# Patient Record
Sex: MALE | Race: WHITE | Employment: OTHER | URBAN - METROPOLITAN AREA
[De-identification: names, ages, dates, MRNs, and addresses within clinical notes are randomized per-mention and may not be internally consistent; named-entity substitution may affect disease eponyms.]

---

## 2021-12-08 ENCOUNTER — OFFICE VISIT (OUTPATIENT)
Dept: FAMILY MEDICINE CLINIC | Facility: CLINIC | Age: 66
End: 2021-12-08
Payer: MEDICARE

## 2021-12-08 VITALS
SYSTOLIC BLOOD PRESSURE: 136 MMHG | BODY MASS INDEX: 29.58 KG/M2 | HEART RATE: 61 BPM | HEIGHT: 64.25 IN | DIASTOLIC BLOOD PRESSURE: 80 MMHG | WEIGHT: 173.25 LBS

## 2021-12-08 DIAGNOSIS — R68.82 DECREASED LIBIDO: ICD-10-CM

## 2021-12-08 DIAGNOSIS — C61 PROSTATE CANCER (HCC): ICD-10-CM

## 2021-12-08 DIAGNOSIS — R61 NIGHT SWEATS: Primary | ICD-10-CM

## 2021-12-08 DIAGNOSIS — R19.4 BOWEL HABIT CHANGES: ICD-10-CM

## 2021-12-08 DIAGNOSIS — F51.01 PRIMARY INSOMNIA: ICD-10-CM

## 2021-12-08 DIAGNOSIS — R14.0 BLOATED ABDOMEN: ICD-10-CM

## 2021-12-08 DIAGNOSIS — N52.9 ERECTILE DYSFUNCTION, UNSPECIFIED ERECTILE DYSFUNCTION TYPE: ICD-10-CM

## 2021-12-08 PROCEDURE — 90662 IIV NO PRSV INCREASED AG IM: CPT | Performed by: FAMILY MEDICINE

## 2021-12-08 PROCEDURE — G0008 ADMIN INFLUENZA VIRUS VAC: HCPCS | Performed by: FAMILY MEDICINE

## 2021-12-08 PROCEDURE — 99204 OFFICE O/P NEW MOD 45 MIN: CPT | Performed by: FAMILY MEDICINE

## 2021-12-08 RX ORDER — SIMETHICONE 125 MG
250 TABLET,CHEWABLE ORAL EVERY 12 HOURS PRN
Qty: 60 TABLET | Refills: 3 | Status: SHIPPED | OUTPATIENT
Start: 2021-12-08

## 2021-12-08 RX ORDER — FLUTICASONE PROPIONATE 50 MCG
2 SPRAY, SUSPENSION (ML) NASAL DAILY
COMMUNITY
Start: 2020-01-10

## 2021-12-08 RX ORDER — TEMAZEPAM 30 MG/1
30 CAPSULE ORAL NIGHTLY PRN
Qty: 90 CAPSULE | Refills: 1 | Status: SHIPPED | OUTPATIENT
Start: 2021-12-08 | End: 2021-12-13

## 2021-12-08 RX ORDER — OMEPRAZOLE 40 MG/1
40 CAPSULE, DELAYED RELEASE ORAL DAILY
COMMUNITY
Start: 2020-08-19

## 2021-12-08 NOTE — PROGRESS NOTES
12/8/2021  11:57 AM    Corinne Ardon is a 74year old male.     Chief complaint(s): Patient presents with:  Establish Care: has been feeling very hot at night (w/out sweats)  Sleep Problem: trouble sleeping  Bloating: x1m    HPI:     Corinne Noblesjohny primary c Age of Onset   • Cancer Father         Prostate cancer   • Cancer Brother         Prosate cancer      Social History: Social History    Tobacco Use      Smoking status: Never Smoker      Smokeless tobacco: Never Used    Vaping Use      Vaping Use: Never us Musculoskeletal: Negative for myalgias. Skin: Negative for rash. Neurological: Negative for dizziness, weakness and headaches. Psychiatric/Behavioral: Positive for suicidal ideas. The patient is nervous/anxious.         PHYSICAL EXAM:   VS: /8 Orders Placed This     UROLOGY - EXTERNAL       RECOMMENDATIONS given include: Patient was reassured of  his medical condition and all questions and concerns were answered.  Patient was informed to please, call our office with any new or further questions o Free T4      Quantiferon TB Plus      Fluzone High Dose 65 yr and older PFS [14937]   . TESTS/PROCEDURES ordered today include sleep study ( to be done at [address] [place] [address] ).     MEDICATIONS:   Requested Prescriptions     Signed Prescriptions Disp Refills   • lula With Platelet      PSA, Total W Reflex To Free      TSH W Reflex To Free T4      Quantiferon TB Plus      Fluzone High Dose 72 yr and older PFS [89520]      Meds This Visit:  Requested Prescriptions     Signed Prescriptions Disp Refills   • simethicone 125

## 2021-12-09 ENCOUNTER — TELEPHONE (OUTPATIENT)
Dept: FAMILY MEDICINE CLINIC | Facility: CLINIC | Age: 66
End: 2021-12-09

## 2021-12-09 RX ORDER — ZOLPIDEM TARTRATE 10 MG/1
10 TABLET ORAL NIGHTLY
Qty: 90 TABLET | Refills: 1 | Status: SHIPPED | OUTPATIENT
Start: 2021-12-09 | End: 2022-12-04

## 2021-12-09 NOTE — TELEPHONE ENCOUNTER
Current Outpatient Medications:   •  temazepam 30 MG Oral Cap, Take 1 capsule (30 mg total) by mouth nightly as needed for Sleep., Disp: 90 capsule, Rfl: 1        Message: Drug not covered by patient plan.  The alternative is [employer]

## 2021-12-10 ENCOUNTER — LAB ENCOUNTER (OUTPATIENT)
Dept: LAB | Age: 66
End: 2021-12-10
Attending: FAMILY MEDICINE
Payer: MEDICARE

## 2021-12-13 ENCOUNTER — TELEPHONE (OUTPATIENT)
Dept: FAMILY MEDICINE CLINIC | Facility: CLINIC | Age: 66
End: 2021-12-13

## 2021-12-13 NOTE — TELEPHONE ENCOUNTER
Current Outpatient Medications:   ••  temazepam 30 MG Oral Cap, Take 1 capsule (30 mg total) by mouth nightly as needed for Sleep., Disp: 90 capsule, Rfl: 1    Key: [id number]  PATIENT LAST NAME: VERNA  : 1955

## 2022-01-26 NOTE — PROGRESS NOTES
1/26/2022  11:42 AM    Anna Rockwell is a 74year old male. Chief complaint(s): Patient presents with: Follow - Up: Sleep problem - reports hot flashes w/o sweats    HPI:     Anna Rockwell primary complaint is regarding as above.      Insomnia details; Dose 65 YRS & Older PRSV Free (72699)                          11/25/2020 12/08/2021      FLUZONE 6 months and older PFS 0.5 ml (27635)                          10/11/2016  11/01/2017      Fluvirin, 3 Years & >, Im                          11/11/2013 No rash.    Psychiatric:         Mood and Affect: Mood normal.         LABORATORY RESULTS:   No results found for: Dalia Denis   Results for orders placed or performed in visit on 12/22/21   LIPID PANEL   Result Value MONOCYTES 10.5 %    EOSINOPHILS 2.4 %    BASOPHILS 0.2 %   TSH W REFLEX TO FREE T4   Result Value Ref Range    TSH W/REFLEX TO FT4 4.24 0.40 - 4.50 mIU/L   PSA, TOTAL WITH REFLEX TO PSA, FREE   Result Value Ref Range    TOTAL PSA 3.4 < OR = 4.0 ng/mL   WENDY requested or ordered in this encounter       Imaging & Referrals:  Jessica Man MD

## 2022-02-02 ENCOUNTER — OFFICE VISIT (OUTPATIENT)
Dept: ALLERGY | Facility: CLINIC | Age: 67
End: 2022-02-02
Payer: MEDICARE

## 2022-02-02 ENCOUNTER — NURSE ONLY (OUTPATIENT)
Dept: ALLERGY | Facility: CLINIC | Age: 67
End: 2022-02-02
Payer: MEDICARE

## 2022-02-02 VITALS
WEIGHT: 172 LBS | HEIGHT: 64.25 IN | BODY MASS INDEX: 29.37 KG/M2 | OXYGEN SATURATION: 99 % | DIASTOLIC BLOOD PRESSURE: 67 MMHG | SYSTOLIC BLOOD PRESSURE: 124 MMHG | HEART RATE: 71 BPM

## 2022-02-02 DIAGNOSIS — L30.9 DERMATITIS: ICD-10-CM

## 2022-02-02 DIAGNOSIS — Z92.29 COVID-19 VACCINE SERIES COMPLETED: ICD-10-CM

## 2022-02-02 DIAGNOSIS — L29.9 PRURITUS: Primary | ICD-10-CM

## 2022-02-02 DIAGNOSIS — L50.9 URTICARIA: ICD-10-CM

## 2022-02-02 DIAGNOSIS — T78.40XA ALLERGY, INITIAL ENCOUNTER: ICD-10-CM

## 2022-02-02 DIAGNOSIS — Z23 FLU VACCINE NEED: ICD-10-CM

## 2022-02-02 PROCEDURE — 95004 PERQ TESTS W/ALRGNC XTRCS: CPT | Performed by: ALLERGY & IMMUNOLOGY

## 2022-02-02 PROCEDURE — 99204 OFFICE O/P NEW MOD 45 MIN: CPT | Performed by: ALLERGY & IMMUNOLOGY

## 2022-02-02 RX ORDER — HYDROXYZINE HYDROCHLORIDE 25 MG/1
25 TABLET, FILM COATED ORAL NIGHTLY PRN
Qty: 30 TABLET | Refills: 0 | Status: SHIPPED | OUTPATIENT
Start: 2022-02-02

## 2022-02-02 RX ORDER — LEVOCETIRIZINE DIHYDROCHLORIDE 5 MG/1
5 TABLET, FILM COATED ORAL NIGHTLY
Qty: 30 TABLET | Refills: 0 | Status: SHIPPED | OUTPATIENT
Start: 2022-02-02 | End: 2022-02-02

## 2022-02-02 RX ORDER — TAMSULOSIN HYDROCHLORIDE 0.4 MG/1
0.4 CAPSULE ORAL DAILY
COMMUNITY
Start: 2022-01-27

## 2022-02-02 RX ORDER — LEVOCETIRIZINE DIHYDROCHLORIDE 5 MG/1
TABLET, FILM COATED ORAL
Qty: 90 TABLET | Refills: 0 | Status: SHIPPED | OUTPATIENT
Start: 2022-02-02 | End: 2022-03-02

## 2022-02-02 NOTE — PATIENT INSTRUCTIONS
#1 pruritus  Dermatographia screen appears negative. Normal CBC CMP TSH. Trial of moisturizer including Vanicream or Aquaphor  Trial of Xyzal, levocetirizine 5 mg once a day in the morning  May add hydroxyzine/Atarax 25 mg at bedtime. May cause sedation and sleepiness    #2 urticaria  Handouts provided and reviewed. See above skin testing to common food and environmental allergens to screen for allergic triggers  Trial of Xyzal, levocetirizine 5 mg once in the morning as an antihistamine. May add hydroxyzine, Atarax 25 mg once a night at bedtime. May cause sedation    #3 COVID vaccination up-to-date x2 doses. Reviewed with patient he is booster eligible.   Encouraged booster    #4 flu vaccine up-to-date      #5 Pneumovax 23 up-to-date

## 2022-02-15 ENCOUNTER — TELEPHONE (OUTPATIENT)
Dept: ALLERGY | Facility: CLINIC | Age: 67
End: 2022-02-15

## 2022-02-15 NOTE — TELEPHONE ENCOUNTER
Fax received from [facility] [place]. Follow up to coverage review inquiry. It appears this is the first notification we have received regarding this inquiry. \"in order to proceed with request for coverage review, we need to have this requested started by you or your patient. You may contact [employer] at [phone], 8 am to 8 pm local time Monday thru Friday, and 8 am to 8 pm EST on Saturday\".     Medication in question: Hydroxyzine hcl 25 mg tablet    Diagnosis code: Pruritus L29.9 & Urticaria L50.9    LOV: 02/02/2022

## 2022-02-24 NOTE — TELEPHONE ENCOUNTER
Fax from Med received requesting we complete PA form. RN completed PA form. Dr. Kristin Tijerina signature needed. Placed at nursing station for his review. RN to fax when completed.

## 2022-02-28 NOTE — TELEPHONE ENCOUNTER
Received PA approval via fax from [facility] for Hydroxyzine 25 mg tablets-#30- 1 tablet by mouth daily. [facility] and spoke with Eleanor Cooper, pharmacist, regarding PA approval for Hydroxyzine. Pharmacist verbalizes understanding and will fill prescription and will contact patient to  medication,no further questions at this time.

## 2022-03-02 ENCOUNTER — OFFICE VISIT (OUTPATIENT)
Dept: ALLERGY | Facility: CLINIC | Age: 67
End: 2022-03-02
Payer: MEDICARE

## 2022-03-02 VITALS
RESPIRATION RATE: 22 BRPM | BODY MASS INDEX: 29.37 KG/M2 | SYSTOLIC BLOOD PRESSURE: 116 MMHG | HEIGHT: 64.25 IN | WEIGHT: 172 LBS | DIASTOLIC BLOOD PRESSURE: 64 MMHG | HEART RATE: 75 BPM | OXYGEN SATURATION: 97 %

## 2022-03-02 DIAGNOSIS — Z23 FLU VACCINE NEED: ICD-10-CM

## 2022-03-02 DIAGNOSIS — L29.9 PRURITUS: Primary | ICD-10-CM

## 2022-03-02 DIAGNOSIS — Z92.29 COVID-19 VACCINE SERIES COMPLETED: ICD-10-CM

## 2022-03-02 DIAGNOSIS — L50.9 URTICARIA: ICD-10-CM

## 2022-03-02 PROCEDURE — 99214 OFFICE O/P EST MOD 30 MIN: CPT | Performed by: ALLERGY & IMMUNOLOGY

## 2022-03-02 RX ORDER — FINASTERIDE 5 MG/1
1 TABLET, FILM COATED ORAL DAILY
COMMUNITY
Start: 2022-03-01

## 2022-03-02 RX ORDER — LEVOCETIRIZINE DIHYDROCHLORIDE 5 MG/1
5 TABLET, FILM COATED ORAL EVERY 12 HOURS PRN
Qty: 180 TABLET | Refills: 1 | Status: SHIPPED | OUTPATIENT
Start: 2022-03-02

## 2022-03-02 NOTE — PATIENT INSTRUCTIONS
#1 pruritus/urticaria    Recs:  Improved with recent trial of Xyzal.  Currently taking Xyzal 5 mg once a night at bedtime. Patient recently started Atarax/hydroxyzine 25 mg last night at bedtime for the first time. Patient still having itching during the day  Dermatographia screen appears negative    Recommend increase Xyzal, levocetirizine 5 mg to twice a day  May still use hydroxyzine/Atarax 25 mg at bedtime but may cause sleepiness or drowsiness  Recommend Vanicream as a moisturizer twice a day to help prevent dry skin    #2 COVID vaccination up-to-date x2 doses.   Recommend booster as patient is booster eligible    #3 flu vaccine up-to-date    Follow-up in 6 months or sooner if needed

## 2022-03-14 ENCOUNTER — TELEPHONE (OUTPATIENT)
Dept: FAMILY MEDICINE CLINIC | Facility: CLINIC | Age: 67
End: 2022-03-14

## 2022-03-14 NOTE — TELEPHONE ENCOUNTER
[facility] [address], [address] [facility], [phone], [phone]       Additional Information    Associated Reports   View Encounter   Priority and Order Details     Outpatient Medication Detail     Disp Refills Start End    fluticasone propionate 50 MCG/ACT Nasal Suspension 1 each 1 3/14/2022     Sig - Route: 2 sprays by Nasal route daily. - Nasal    Sent to pharmacy as: Fluticasone Propionate 50 MCG/ACT Nasal Suspension (FLONASE)    E-Prescribing Status: Receipt confirmed by pharmacy (3/14/2022 10:05 AM CDT)

## 2022-05-17 ENCOUNTER — OFFICE VISIT (OUTPATIENT)
Dept: FAMILY MEDICINE CLINIC | Facility: CLINIC | Age: 67
End: 2022-05-17
Payer: MEDICARE

## 2022-05-17 ENCOUNTER — HOSPITAL ENCOUNTER (OUTPATIENT)
Dept: GENERAL RADIOLOGY | Age: 67
Discharge: HOME | End: 2022-05-17
Attending: FAMILY MEDICINE
Payer: MEDICARE

## 2022-05-17 VITALS
HEART RATE: 61 BPM | DIASTOLIC BLOOD PRESSURE: 68 MMHG | BODY MASS INDEX: 28.68 KG/M2 | HEIGHT: 64 IN | WEIGHT: 168 LBS | SYSTOLIC BLOOD PRESSURE: 119 MMHG

## 2022-05-17 DIAGNOSIS — M25.551 HIP PAIN, ACUTE, RIGHT: ICD-10-CM

## 2022-05-17 DIAGNOSIS — M25.561 ACUTE PAIN OF RIGHT KNEE: ICD-10-CM

## 2022-05-17 DIAGNOSIS — M25.551 HIP PAIN, ACUTE, RIGHT: Primary | ICD-10-CM

## 2022-05-17 PROCEDURE — 73502 X-RAY EXAM HIP UNI 2-3 VIEWS: CPT | Performed by: FAMILY MEDICINE

## 2022-05-17 PROCEDURE — 73562 X-RAY EXAM OF KNEE 3: CPT | Performed by: FAMILY MEDICINE

## 2022-05-17 PROCEDURE — 99213 OFFICE O/P EST LOW 20 MIN: CPT | Performed by: FAMILY MEDICINE

## 2022-05-17 PROCEDURE — 96372 THER/PROPH/DIAG INJ SC/IM: CPT | Performed by: FAMILY MEDICINE

## 2022-05-17 RX ORDER — TEA TREE OIL 100 %
OIL (ML) TOPICAL
Qty: 180 CAPSULE | Refills: 3 | Status: SHIPPED | OUTPATIENT
Start: 2022-05-17

## 2022-05-17 RX ORDER — NAPROXEN 500 MG/1
500 TABLET ORAL 2 TIMES DAILY WITH MEALS
Qty: 60 TABLET | Refills: 1 | Status: SHIPPED | OUTPATIENT
Start: 2022-05-17

## 2022-05-17 RX ORDER — METHYLPREDNISOLONE 4 MG/1
TABLET ORAL
Qty: 21 EACH | Refills: 0 | Status: SHIPPED | OUTPATIENT
Start: 2022-05-17

## 2022-05-17 RX ORDER — KETOROLAC TROMETHAMINE 30 MG/ML
30 INJECTION, SOLUTION INTRAMUSCULAR; INTRAVENOUS ONCE
Status: COMPLETED | OUTPATIENT
Start: 2022-05-17 | End: 2022-05-17

## 2022-05-17 RX ADMIN — KETOROLAC TROMETHAMINE 30 MG: 30 INJECTION, SOLUTION INTRAMUSCULAR; INTRAVENOUS at 13:09:00

## 2022-05-26 ENCOUNTER — TELEPHONE (OUTPATIENT)
Dept: FAMILY MEDICINE CLINIC | Facility: CLINIC | Age: 67
End: 2022-05-26

## 2022-05-26 DIAGNOSIS — M25.551 HIP PAIN, ACUTE, RIGHT: Primary | ICD-10-CM

## 2022-05-26 NOTE — TELEPHONE ENCOUNTER
Patient states during last visit Dr. Alex Loya gave him a shot for pain in his leg and had been doing well, pain is back and would like to know if Dr. Alex Loya can prescribe him something for the pain.

## 2022-05-26 NOTE — TELEPHONE ENCOUNTER
With , patient stated that saw Dr Simran Acuña on 5/17/2022 and had a shot for pain in his right groin/right knee. Patient stated that the pain returned yesterday. Having the pain in the right groin radiating to the right knee. Painful to sit, stand, or put on his shoes. Pain level is 7-8/10 when not doing anything, when tries to lift the leg pain level is 10/10. Taking naproxen but not really helping. Asked if he ever took the medrol dose pack and he stated that he did not. Patient is limping. Patient wanted to get something prescribed for the pain? Please advise.

## 2022-05-26 NOTE — TELEPHONE ENCOUNTER
With assist of language line #[phone]  Attempted to call pt, no answer,no vm set up. Please attempt again later.

## 2022-05-26 NOTE — TELEPHONE ENCOUNTER
With , advised patient of Dr. Lindsay Salvador note. Patient verbalized understanding and now remembers taking the medrol dose pack stated that a lot of pills the first day and less and less everyday. Stated finished that a few days ago. Please advise on next step. Ren[facility] pharmacist indicated that the medrol dose pack was picked up by patient on 5/17/2022.

## 2022-05-27 NOTE — TELEPHONE ENCOUNTER
With [employer] ID# [id number]. Identified patient's name and . Informed of recommendation for Physical Therapy. He states that he began exercising at home and is feeling better.  He states he will discuss further with Dr. Bishop Cooney at his upcoming appointment:    Future Appointments   Date Time Provider Torrey Franca   2022 10:00 AM Sulma Wilkes MD [facility], [place]   2022 11:30 AM Abby Ortiz MD [facility] Lorri Nava

## 2022-05-31 ENCOUNTER — TELEPHONE (OUTPATIENT)
Dept: ALLERGY | Facility: CLINIC | Age: 67
End: 2022-05-31

## 2022-06-01 NOTE — TELEPHONE ENCOUNTER
Medication PA requested:  levocetirizine dihydrochloride 5mg         Dx Code:   Pruritus L29.9     Urticaria L50.9     Description of Diagnosis:   Pruritus L29.9     Urticaria L50.9    Key or Insurance Telephone #:Pedroza [id number]     CPT Code:      Case Number/Pending Referral #:      Pt. Authorization Number:     Pt.  Authorization Date:     Pt contacted/Pharmacy contacted if needed:

## 2022-06-02 NOTE — TELEPHONE ENCOUNTER
Medication PA Requested: levocetirizine (XYZAL) 5 MG Oral Tab                                                         CoverMyMeds Used:  Key:  Quantity:180 tablet  Day Supply:  SigTake 1 tablet (5 mg total) by mouth every 12 (twelve) hours as needed for Allergies.:  DX Code:Pruritus L29.9  Urticaria L50.9                                       Faxed [employer] PA form with [facility] 03/02/2022    Awaiting determination.

## 2022-06-08 NOTE — TELEPHONE ENCOUNTER
Attempted to call patient to inform him of the PA approval. Voicemail box has not been set up. No mychart available. Approval sent to scan into this encounter.

## 2022-06-08 NOTE — TELEPHONE ENCOUNTER
[employer]  [phone], Spoke with Dodie Ramon. She states PA for Levocetirizine handled at [phone], transferred to [facility]. She states Pa rejecting for plan  Limitations, only covered for one per day. Performed PA for quantity limit, she states is approved from 5/9/2022-6/8/2023, case #[id number]. She got paid claim on test.   Call ref # [id number]    [facility] [phone], spoke with [facility]. She states 180 for 90 went through. Please notify patient, no my chart account available. Thank you!

## 2022-08-11 ENCOUNTER — OFFICE VISIT (OUTPATIENT)
Dept: FAMILY MEDICINE CLINIC | Facility: CLINIC | Age: 67
End: 2022-08-11
Payer: MEDICARE

## 2022-08-11 VITALS
HEIGHT: 64 IN | SYSTOLIC BLOOD PRESSURE: 124 MMHG | DIASTOLIC BLOOD PRESSURE: 73 MMHG | BODY MASS INDEX: 27.42 KG/M2 | HEART RATE: 61 BPM | WEIGHT: 160.63 LBS

## 2022-08-11 DIAGNOSIS — M15.9 PRIMARY OSTEOARTHRITIS INVOLVING MULTIPLE JOINTS: Primary | ICD-10-CM

## 2022-08-11 PROCEDURE — 99213 OFFICE O/P EST LOW 20 MIN: CPT | Performed by: FAMILY MEDICINE

## 2022-08-11 RX ORDER — FLUTICASONE PROPIONATE 50 MCG
2 SPRAY, SUSPENSION (ML) NASAL DAILY
Qty: 1 EACH | Refills: 1 | Status: SHIPPED | OUTPATIENT
Start: 2022-08-11

## 2022-09-07 ENCOUNTER — OFFICE VISIT (OUTPATIENT)
Dept: ALLERGY | Facility: CLINIC | Age: 67
End: 2022-09-07
Payer: MEDICARE

## 2022-09-07 VITALS
RESPIRATION RATE: 18 BRPM | WEIGHT: 158 LBS | SYSTOLIC BLOOD PRESSURE: 120 MMHG | HEART RATE: 55 BPM | OXYGEN SATURATION: 97 % | HEIGHT: 64 IN | DIASTOLIC BLOOD PRESSURE: 66 MMHG | BODY MASS INDEX: 26.98 KG/M2

## 2022-09-07 DIAGNOSIS — Z23 FLU VACCINE NEED: ICD-10-CM

## 2022-09-07 DIAGNOSIS — L50.9 URTICARIA: ICD-10-CM

## 2022-09-07 DIAGNOSIS — Z92.29 COVID-19 VACCINE SERIES COMPLETED: ICD-10-CM

## 2022-09-07 DIAGNOSIS — L29.9 PRURITUS: Primary | ICD-10-CM

## 2022-09-07 PROCEDURE — 99214 OFFICE O/P EST MOD 30 MIN: CPT | Performed by: ALLERGY & IMMUNOLOGY

## 2022-09-07 RX ORDER — LEVOCETIRIZINE DIHYDROCHLORIDE 5 MG/1
5 TABLET, FILM COATED ORAL DAILY
Qty: 90 TABLET | Refills: 1 | Status: SHIPPED | OUTPATIENT
Start: 2022-09-07

## 2022-09-07 RX ORDER — HYDROXYZINE HYDROCHLORIDE 25 MG/1
25 TABLET, FILM COATED ORAL NIGHTLY PRN
Qty: 90 TABLET | Refills: 1 | Status: SHIPPED | OUTPATIENT
Start: 2022-09-07

## 2022-09-07 NOTE — PATIENT INSTRUCTIONS
#1 pruritus/dermatitis  Stable at this time with current regimen including Xyzal in the morning, hydroxyzine at bedtime. Patient also using triamcinolone as needed. Continue with above regimen. Medications refilled  Dove as a soap or Cetaphil as a cleanser  Recommend moisturizers including Aquaphor or Cetaphil CeraVe a or Vanicream    #2 COVID vaccines x3 doses. Reviewed with patient he is eligible for fourth dose. Based upon his age    #3 recommend high-dose flu vaccine this fall    #4 pneumonia pneumonia vaccine up-to-date from 2020 .   May consider Prevnar 20 as well to complete the pneumonia vaccine series    Follow-up in 1 year or sooner if needed

## 2022-10-11 ENCOUNTER — OFFICE VISIT (OUTPATIENT)
Dept: FAMILY MEDICINE CLINIC | Facility: CLINIC | Age: 67
End: 2022-10-11
Payer: MEDICARE

## 2022-10-11 VITALS
HEIGHT: 64 IN | SYSTOLIC BLOOD PRESSURE: 139 MMHG | BODY MASS INDEX: 27.35 KG/M2 | WEIGHT: 160.19 LBS | HEART RATE: 59 BPM | DIASTOLIC BLOOD PRESSURE: 78 MMHG

## 2022-10-11 DIAGNOSIS — R68.82 DECREASED LIBIDO: ICD-10-CM

## 2022-10-11 DIAGNOSIS — Z00.00 MEDICARE ANNUAL WELLNESS VISIT, SUBSEQUENT: Primary | ICD-10-CM

## 2022-10-11 DIAGNOSIS — R19.4 BOWEL HABIT CHANGES: ICD-10-CM

## 2022-10-11 DIAGNOSIS — E55.9 VITAMIN D DEFICIENCY: ICD-10-CM

## 2022-10-11 DIAGNOSIS — Z12.11 COLON CANCER SCREENING: ICD-10-CM

## 2022-10-11 DIAGNOSIS — R73.9 HYPERGLYCEMIA: ICD-10-CM

## 2022-10-11 DIAGNOSIS — C61 PROSTATE CANCER (HCC): ICD-10-CM

## 2022-10-11 DIAGNOSIS — H53.8 BLURRED VISION: ICD-10-CM

## 2022-10-11 DIAGNOSIS — Z91.81 RISK FOR FALLS: ICD-10-CM

## 2022-10-11 DIAGNOSIS — N52.9 ERECTILE DYSFUNCTION, UNSPECIFIED ERECTILE DYSFUNCTION TYPE: ICD-10-CM

## 2022-10-11 DIAGNOSIS — M15.9 PRIMARY OSTEOARTHRITIS INVOLVING MULTIPLE JOINTS: ICD-10-CM

## 2022-10-11 DIAGNOSIS — R61 NIGHT SWEATS: ICD-10-CM

## 2022-10-11 DIAGNOSIS — H43.399 VITREOUS FLOATERS, UNSPECIFIED LATERALITY: ICD-10-CM

## 2022-10-11 DIAGNOSIS — F51.01 PRIMARY INSOMNIA: ICD-10-CM

## 2022-10-11 PROCEDURE — 90677 PCV20 VACCINE IM: CPT | Performed by: FAMILY MEDICINE

## 2022-10-11 PROCEDURE — 90662 IIV NO PRSV INCREASED AG IM: CPT | Performed by: FAMILY MEDICINE

## 2022-10-11 PROCEDURE — G0439 PPPS, SUBSEQ VISIT: HCPCS | Performed by: FAMILY MEDICINE

## 2022-10-11 PROCEDURE — G0008 ADMIN INFLUENZA VIRUS VAC: HCPCS | Performed by: FAMILY MEDICINE

## 2022-10-11 PROCEDURE — G0009 ADMIN PNEUMOCOCCAL VACCINE: HCPCS | Performed by: FAMILY MEDICINE

## 2022-10-18 RX ORDER — FLUTICASONE PROPIONATE 50 MCG
SPRAY, SUSPENSION (ML) NASAL
Qty: 16 G | Refills: 0 | Status: SHIPPED | OUTPATIENT
Start: 2022-10-18

## 2022-10-26 ENCOUNTER — NURSE TRIAGE (OUTPATIENT)
Dept: FAMILY MEDICINE CLINIC | Facility: CLINIC | Age: 67
End: 2022-10-26

## 2022-10-26 ENCOUNTER — APPOINTMENT (OUTPATIENT)
Dept: GENERAL RADIOLOGY | Age: 67
End: 2022-10-26
Attending: PHYSICIAN ASSISTANT
Payer: MEDICARE

## 2022-10-26 ENCOUNTER — HOSPITAL ENCOUNTER (OUTPATIENT)
Age: 67
Discharge: HOME | End: 2022-10-26
Payer: MEDICARE

## 2022-10-26 VITALS
OXYGEN SATURATION: 96 % | SYSTOLIC BLOOD PRESSURE: 125 MMHG | DIASTOLIC BLOOD PRESSURE: 75 MMHG | HEART RATE: 87 BPM | TEMPERATURE: 100 F | RESPIRATION RATE: 16 BRPM

## 2022-10-26 DIAGNOSIS — Z20.822 ENCOUNTER FOR LABORATORY TESTING FOR COVID-19 VIRUS: ICD-10-CM

## 2022-10-26 DIAGNOSIS — R05.1 ACUTE COUGH: Primary | ICD-10-CM

## 2022-10-26 LAB — SARS-COV-2 RNA RESP QL NAA+PROBE: NOT DETECTED

## 2022-10-26 PROCEDURE — U0002 COVID-19 LAB TEST NON-CDC: HCPCS | Performed by: PHYSICIAN ASSISTANT

## 2022-10-26 PROCEDURE — 99203 OFFICE O/P NEW LOW 30 MIN: CPT | Performed by: PHYSICIAN ASSISTANT

## 2022-10-26 PROCEDURE — 71046 X-RAY EXAM CHEST 2 VIEWS: CPT | Performed by: PHYSICIAN ASSISTANT

## 2022-10-26 RX ORDER — CODEINE PHOSPHATE AND GUAIFENESIN 10; 100 MG/5ML; MG/5ML
5 SOLUTION ORAL EVERY 6 HOURS PRN
Qty: 50 ML | Refills: 0 | Status: SHIPPED | OUTPATIENT
Start: 2022-10-26

## 2022-10-26 RX ORDER — BENZONATATE 100 MG/1
100 CAPSULE ORAL 3 TIMES DAILY PRN
Qty: 30 CAPSULE | Refills: 0 | Status: SHIPPED | OUTPATIENT
Start: 2022-10-26 | End: 2022-11-25

## 2022-10-26 RX ORDER — ALBUTEROL SULFATE 90 UG/1
2 AEROSOL, METERED RESPIRATORY (INHALATION) EVERY 4 HOURS PRN
Qty: 1 EACH | Refills: 0 | Status: SHIPPED | OUTPATIENT
Start: 2022-10-26 | End: 2022-11-25

## 2022-10-26 NOTE — ED INITIAL ASSESSMENT (HPI)
Pt here w c/o cough, HA, feeling like he cant cough up phlemg and feels like wheezing, no fever. States having symptoms x 1 wk.

## 2022-11-05 LAB
% FREE PSA: 31 % (CALC)
ABSOLUTE BASOPHILS: 10 CELLS/UL (ref 0–200)
ABSOLUTE EOSINOPHILS: 130 CELLS/UL (ref 15–500)
ABSOLUTE LYMPHOCYTES: 1845 CELLS/UL (ref 850–3900)
ABSOLUTE MONOCYTES: 490 CELLS/UL (ref 200–950)
ABSOLUTE NEUTROPHILS: 2525 CELLS/UL (ref 1500–7800)
ALBUMIN/GLOBULIN RATIO: 1.3 (CALC) (ref 1–2.5)
ALBUMIN: 4 G/DL (ref 3.6–5.1)
ALKALINE PHOSPHATASE: 79 U/L (ref 35–144)
ALT: 16 U/L (ref 9–46)
APPEARANCE: CLEAR
AST: 13 U/L (ref 10–35)
BASOPHILS: 0.2 %
BILIRUBIN, TOTAL: 0.4 MG/DL (ref 0.2–1.2)
BILIRUBIN: NEGATIVE
BUN: 16 MG/DL (ref 7–25)
CALCIUM: 9.2 MG/DL (ref 8.6–10.3)
CARBON DIOXIDE: 27 MMOL/L (ref 20–32)
CHLORIDE: 104 MMOL/L (ref 98–110)
CHOL/HDLC RATIO: 3 (CALC)
CHOLESTEROL, TOTAL: 154 MG/DL
COLOR: YELLOW
CREATININE: 0.9 MG/DL (ref 0.7–1.35)
EGFR: 93 ML/MIN/1.73M2
EOSINOPHILS: 2.6 %
FREE PSA: 0.8 NG/ML
GLOBULIN: 3.2 G/DL (CALC) (ref 1.9–3.7)
GLUCOSE: 89 MG/DL (ref 65–99)
GLUCOSE: NEGATIVE
HDL CHOLESTEROL: 51 MG/DL
HEMATOCRIT: 41.1 % (ref 38.5–50)
HEMOGLOBIN A1C: 5.2 % OF TOTAL HGB
HEMOGLOBIN: 13.8 G/DL (ref 13.2–17.1)
KETONES: NEGATIVE
LDL-CHOLESTEROL: 89 MG/DL (CALC)
LEUKOCYTE ESTERASE: NEGATIVE
LYMPHOCYTES: 36.9 %
MCH: 30.3 PG (ref 27–33)
MCHC: 33.6 G/DL (ref 32–36)
MCV: 90.1 FL (ref 80–100)
MITOGEN-NIL: >10 IU/ML
MONOCYTES: 9.8 %
MPV: 10.1 FL (ref 7.5–12.5)
NEUTROPHILS: 50.5 %
NIL: 0.02 IU/ML
NITRITE: NEGATIVE
NON-HDL CHOLESTEROL: 103 MG/DL (CALC)
OCCULT BLOOD: NEGATIVE
PH: 5.5 (ref 5–8)
PLATELET COUNT: 207 THOUSAND/UL (ref 140–400)
POTASSIUM: 4.2 MMOL/L (ref 3.5–5.3)
PROTEIN, TOTAL: 7.2 G/DL (ref 6.1–8.1)
PROTEIN: NEGATIVE
QUANTIFERON(R)-TB GOLD PLUS, 1 TUBE: NEGATIVE
RDW: 12.6 % (ref 11–15)
RED BLOOD CELL COUNT: 4.56 MILLION/UL (ref 4.2–5.8)
SIGNAL TO CUT-OFF: 0.05
SODIUM: 137 MMOL/L (ref 135–146)
SPECIFIC GRAVITY: 1.02 (ref 1–1.03)
TB1-NIL: 0 IU/ML
TB2-NIL: 0.01 IU/ML
TOTAL PSA: 2.6 NG/ML
TRIGLYCERIDES: 58 MG/DL
TSH W/REFLEX TO FT4: 3.41 MIU/L (ref 0.4–4.5)
WHITE BLOOD CELL COUNT: 5 THOUSAND/UL (ref 3.8–10.8)

## 2022-11-23 RX ORDER — FLUTICASONE PROPIONATE 50 MCG
2 SPRAY, SUSPENSION (ML) NASAL DAILY
Qty: 48 G | Refills: 0 | Status: SHIPPED | OUTPATIENT
Start: 2022-11-23

## 2022-11-24 NOTE — TELEPHONE ENCOUNTER
Refill passed per Matteo Rachel protocol.    Requested Prescriptions   Pending Prescriptions Disp Refills    FLUTICASONE PROPIONATE 50 MCG/ACT Nasal Suspension [Pharmacy Med Name: FLUTICASONE 50MCG CONSTANZA SP(120SP) RX] 48 g 0     Sig: SHAKE LIQUID AND USE 2 SPRAYS IN EACH NOSTRIL DAILY       Allergy Medication Protocol Passed - 11/23/2022 12:30 PM        Passed - In person appointment or virtual visit in the past 12 mos or appointment in next 3 mos     Recent Outpatient Visits              1 month ago Medicare annual wellness visit, subsequent    [address], Yuriy Ospina MD    Office Visit    2 months ago Pruritus    [facility], Nayla Rust MD    Office Visit    3 months ago Primary osteoarthritis involving multiple joints    [address], Yuriy Ospina MD    Office Visit    6 months ago Hip pain, acute, right    [facility], Yuriy Ospina MD    Office Visit    8 months ago Primary insomnia    [address], Britton Balderas MD    Office Visit          Future Appointments         Provider Department Appt Notes    In 1 month MD Matteo Soria [address], Yuriy Tellez     In 9 months MD Matteo Milian, [facility]                     Recent Outpatient Visits              [facility]    [facility], Britton Balderas MD    Office Visit    2 months ago Pruritus    [facility]awanda Torres [facility], Nayla Rust MD    Office Visit    3 months ago Primary osteoarthritis involving multiple joints    [facility], Yuriy Ospina MD    Office Visit    6 months ago Hip pain, acute, right    [facility], Yuriy Ospina MD    Office Visit    8 months ago Primary insomnia    [place] Yuriy Macias MD    Office Visit            Future Appointments         Provider Department Appt Notes    In 1 month Kaila Klein MD [address], Yuriy Follow     In 9 months Yoli Vale MD [address]

## 2022-11-24 NOTE — TELEPHONE ENCOUNTER
Refill passed per [address] protocol.    Requested Prescriptions   Pending Prescriptions Disp Refills    FLUTICASONE PROPIONATE 50 MCG/ACT Nasal Suspension [Pharmacy Med Name: FLUTICASONE 50MCG CONSTANZA SP(120SP) RX] 48 g 0     Sig: SHAKE LIQUID AND USE 2 SPRAYS IN EACH NOSTRIL DAILY       Allergy Medication Protocol Passed - 11/23/2022 12:30 PM        Passed - In person appointment or virtual visit in the past 12 mos or appointment in next 3 mos     Recent Outpatient Visits              1 month ago Medicare annual wellness visit, subsequent    [address] Yuri[address] Lewis MD    Office Visit    2 months ago Pruritus    [address], [facility], Serjio Joseph MD    Office Visit    3 months ago Primary osteoarthritis involving multiple joints    [address] Yuri[address] Lewis MD    Office Visit    6 months ago Hip pain, acute, right    [address], Yuriy Sears MD    Office Visit    8 months ago Primary insomnia    [address], Evelyn Mendosa MD    Office Visit          Future Appointments         Provider Department Appt Notes    In 1 month June Sears MD [address], Yuriy Tellez     In 9 months Xiao Jimenez MD [address], [address] [facility]                     Recent Outpatient Visits              [facility]    [address], Evelyn Mendosa MD    Office Visit    2 months ago Pruritus    [address] [facility], [address] [facility], Serjio Joseph MD    Office Visit    3 months ago Primary osteoarthritis involving multiple joints    [address], Yuriy Sears MD    Office Visit    6 months ago Hip pain, acute, right    [address], Yuriy Sears MD    Office Visit    8 months ago Primary insomnia    [place] Yuriy Anderson MD    Office Visit            Future Appointments         Provider Department Appt Notes    In 1 month Dmirti Peguero MD [address] Follow     In 9 months Salty Maxwell MD [address]

## 2022-11-28 RX ORDER — ZOLPIDEM TARTRATE 10 MG/1
10 TABLET ORAL NIGHTLY
Qty: 90 TABLET | Refills: 1 | Status: SHIPPED | OUTPATIENT
Start: 2022-11-28

## 2022-12-10 RX ORDER — OMEPRAZOLE 40 MG/1
40 CAPSULE, DELAYED RELEASE ORAL DAILY
Qty: 90 CAPSULE | Refills: 1 | Status: SHIPPED | OUTPATIENT
Start: 2022-12-10

## 2022-12-10 NOTE — TELEPHONE ENCOUNTER
Refill passed per [facility] protocol.      Requested Prescriptions   Pending Prescriptions Disp Refills    OMEPRAZOLE 40 MG Oral Capsule Delayed Release [Pharmacy Med Name: OMEPRAZOLE 40MG CAPSULES] 90 capsule 2     Sig: TAKE 1 CAPSULE(40 MG) BY MOUTH DAILY       Gastrointestional Medication Protocol Passed - 12/9/2022  1:30 PM        Passed - In person appointment or virtual visit in the past 12 mos or appointment in next 3 mos     Recent Outpatient Visits              2 months ago Medicare annual wellness visit, subsequent    [address] Yuriy Lewis MD    Office Visit    3 months ago Pruritus    [place] [employer] [facility], [address] [facility], Kishore Ahmadi MD    Office Visit    4 months ago Primary osteoarthritis involving multiple joints    [address] Yuriy Lewis MD    Office Visit    6 months ago [facility] pain, acute, right    [facility], [address], Yuriy Bernard MD    Office Visit    9 months ago Primary insomnia    [facility], [address], Burgess Nakita MD    Office Visit          Future Appointments         Provider Department Appt Notes    In 1 month Moises Bernard MD [place] [employer] [facility], [address], Yuriy Tellez     In 9 months Angel Guardado MD [place] [employer] [facility], [address] [facility]                      Recent Outpatient Visits              [facility] months [facility], subsequent    [place] [employer] [facility], [address], Burgess Nakita MD    Office Visit    3 months ago Pruritus    [place] [employer] [facility], [address] [facility], Kishore Ahmadi MD    Office Visit    4 months ago Primary osteoarthritis involving multiple joints    [place] [employer] [facility], [address], Yuriy Bernard MD    Office Visit    6 months ago Hip pain, acute, right    [place] [employer] [facility], [address], Yuriy Bernard MD    Office Visit    9 months ago Primary insomnia    Steve Antonio Camille Sarabia MD    Office Visit             Future Appointments         Provider Department Appt Notes    In 1 month Jose Antonio Alvarez MD [facility], [facility], [address], Yuriy Follow     In 9 months Ajay Wilhelm MD [facility], [facility], [address]

## 2023-01-17 ENCOUNTER — OFFICE VISIT (OUTPATIENT)
Dept: FAMILY MEDICINE CLINIC | Facility: CLINIC | Age: 68
End: 2023-01-17

## 2023-01-17 VITALS
HEIGHT: 64 IN | DIASTOLIC BLOOD PRESSURE: 68 MMHG | SYSTOLIC BLOOD PRESSURE: 130 MMHG | BODY MASS INDEX: 27.59 KG/M2 | HEART RATE: 68 BPM | WEIGHT: 161.63 LBS

## 2023-01-17 DIAGNOSIS — J30.89 NON-SEASONAL ALLERGIC RHINITIS, UNSPECIFIED TRIGGER: Primary | ICD-10-CM

## 2023-01-17 PROCEDURE — 3075F SYST BP GE 130 - 139MM HG: CPT | Performed by: FAMILY MEDICINE

## 2023-01-17 PROCEDURE — 99213 OFFICE O/P EST LOW 20 MIN: CPT | Performed by: FAMILY MEDICINE

## 2023-01-17 PROCEDURE — 3008F BODY MASS INDEX DOCD: CPT | Performed by: FAMILY MEDICINE

## 2023-01-17 PROCEDURE — 1126F AMNT PAIN NOTED NONE PRSNT: CPT | Performed by: FAMILY MEDICINE

## 2023-01-17 PROCEDURE — 3078F DIAST BP <80 MM HG: CPT | Performed by: FAMILY MEDICINE

## 2023-01-17 RX ORDER — DESLORATADINE 5 MG/1
5 TABLET ORAL DAILY
Qty: 90 TABLET | Refills: 3 | Status: SHIPPED | OUTPATIENT
Start: 2023-01-17 | End: 2024-01-12

## 2023-01-17 RX ORDER — FLUTICASONE PROPIONATE 50 MCG
2 SPRAY, SUSPENSION (ML) NASAL DAILY
Qty: 48 G | Refills: 2 | Status: SHIPPED | OUTPATIENT
Start: 2023-01-17

## 2023-08-18 ENCOUNTER — EKG ENCOUNTER (OUTPATIENT)
Dept: LAB | Age: 68
End: 2023-08-18
Attending: FAMILY MEDICINE
Payer: MEDICARE

## 2023-08-18 ENCOUNTER — OFFICE VISIT (OUTPATIENT)
Dept: FAMILY MEDICINE CLINIC | Facility: CLINIC | Age: 68
End: 2023-08-18

## 2023-08-18 ENCOUNTER — LAB ENCOUNTER (OUTPATIENT)
Dept: LAB | Age: 68
End: 2023-08-18
Attending: FAMILY MEDICINE
Payer: MEDICARE

## 2023-08-18 VITALS
HEIGHT: 64 IN | BODY MASS INDEX: 27.21 KG/M2 | HEART RATE: 62 BPM | WEIGHT: 159.38 LBS | DIASTOLIC BLOOD PRESSURE: 84 MMHG | SYSTOLIC BLOOD PRESSURE: 151 MMHG

## 2023-08-18 DIAGNOSIS — R73.9 HYPERGLYCEMIA: ICD-10-CM

## 2023-08-18 DIAGNOSIS — E55.9 VITAMIN D DEFICIENCY: ICD-10-CM

## 2023-08-18 DIAGNOSIS — F51.01 PRIMARY INSOMNIA: ICD-10-CM

## 2023-08-18 DIAGNOSIS — M15.9 PRIMARY OSTEOARTHRITIS INVOLVING MULTIPLE JOINTS: ICD-10-CM

## 2023-08-18 DIAGNOSIS — R68.82 DECREASED LIBIDO: ICD-10-CM

## 2023-08-18 DIAGNOSIS — C61 PROSTATE CANCER (HCC): ICD-10-CM

## 2023-08-18 DIAGNOSIS — Z00.00 MEDICARE ANNUAL WELLNESS VISIT, SUBSEQUENT: ICD-10-CM

## 2023-08-18 DIAGNOSIS — Z12.11 COLON CANCER SCREENING: ICD-10-CM

## 2023-08-18 DIAGNOSIS — H53.8 BLURRED VISION: ICD-10-CM

## 2023-08-18 DIAGNOSIS — H43.399 VITREOUS FLOATERS, UNSPECIFIED LATERALITY: ICD-10-CM

## 2023-08-18 DIAGNOSIS — N52.9 ERECTILE DYSFUNCTION, UNSPECIFIED ERECTILE DYSFUNCTION TYPE: ICD-10-CM

## 2023-08-18 DIAGNOSIS — Z91.81 RISK FOR FALLS: ICD-10-CM

## 2023-08-18 DIAGNOSIS — Z00.00 MEDICARE ANNUAL WELLNESS VISIT, SUBSEQUENT: Primary | ICD-10-CM

## 2023-08-18 LAB
ALBUMIN SERPL-MCNC: 3.8 G/DL (ref 3.4–5)
ALBUMIN/GLOB SERPL: 1 {RATIO} (ref 1–2)
ALP LIVER SERPL-CCNC: 91 U/L
ALT SERPL-CCNC: 28 U/L
ANION GAP SERPL CALC-SCNC: 2 MMOL/L (ref 0–18)
AST SERPL-CCNC: 15 U/L (ref 15–37)
BASOPHILS # BLD AUTO: 0.02 X10(3) UL (ref 0–0.2)
BASOPHILS NFR BLD AUTO: 0.4 %
BILIRUB SERPL-MCNC: 0.6 MG/DL (ref 0.1–2)
BILIRUB UR QL STRIP.AUTO: NEGATIVE
BUN BLD-MCNC: 17 MG/DL (ref 7–18)
CALCIUM BLD-MCNC: 9.1 MG/DL (ref 8.5–10.1)
CHLORIDE SERPL-SCNC: 107 MMOL/L (ref 98–112)
CHOLEST SERPL-MCNC: 158 MG/DL (ref ?–200)
CLARITY UR REFRACT.AUTO: CLEAR
CO2 SERPL-SCNC: 28 MMOL/L (ref 21–32)
CREAT BLD-MCNC: 0.8 MG/DL
EGFRCR SERPLBLD CKD-EPI 2021: 96 ML/MIN/1.73M2 (ref 60–?)
EOSINOPHIL # BLD AUTO: 0.09 X10(3) UL (ref 0–0.7)
EOSINOPHIL NFR BLD AUTO: 1.8 %
ERYTHROCYTE [DISTWIDTH] IN BLOOD BY AUTOMATED COUNT: 12.4 %
EST. AVERAGE GLUCOSE BLD GHB EST-MCNC: 105 MG/DL (ref 68–126)
FASTING PATIENT LIPID ANSWER: YES
FASTING STATUS PATIENT QL REPORTED: YES
GLOBULIN PLAS-MCNC: 3.7 G/DL (ref 2.8–4.4)
GLUCOSE BLD-MCNC: 93 MG/DL (ref 70–99)
GLUCOSE UR STRIP.AUTO-MCNC: NORMAL MG/DL
HBA1C MFR BLD: 5.3 % (ref ?–5.7)
HCT VFR BLD AUTO: 42.1 %
HDLC SERPL-MCNC: 54 MG/DL (ref 40–59)
HGB BLD-MCNC: 14.3 G/DL
IMM GRANULOCYTES # BLD AUTO: 0.01 X10(3) UL (ref 0–1)
IMM GRANULOCYTES NFR BLD: 0.2 %
KETONES UR STRIP.AUTO-MCNC: NEGATIVE MG/DL
LDLC SERPL CALC-MCNC: 81 MG/DL (ref ?–100)
LEUKOCYTE ESTERASE UR QL STRIP.AUTO: NEGATIVE
LYMPHOCYTES # BLD AUTO: 2 X10(3) UL (ref 1–4)
LYMPHOCYTES NFR BLD AUTO: 40.8 %
MCH RBC QN AUTO: 30.8 PG (ref 26–34)
MCHC RBC AUTO-ENTMCNC: 34 G/DL (ref 31–37)
MCV RBC AUTO: 90.7 FL
MONOCYTES # BLD AUTO: 0.61 X10(3) UL (ref 0.1–1)
MONOCYTES NFR BLD AUTO: 12.4 %
NEUTROPHILS # BLD AUTO: 2.17 X10 (3) UL (ref 1.5–7.7)
NEUTROPHILS # BLD AUTO: 2.17 X10(3) UL (ref 1.5–7.7)
NEUTROPHILS NFR BLD AUTO: 44.4 %
NITRITE UR QL STRIP.AUTO: NEGATIVE
NONHDLC SERPL-MCNC: 104 MG/DL (ref ?–130)
OSMOLALITY SERPL CALC.SUM OF ELEC: 285 MOSM/KG (ref 275–295)
PH UR STRIP.AUTO: 5 [PH] (ref 5–8)
PLATELET # BLD AUTO: 163 10(3)UL (ref 150–450)
POTASSIUM SERPL-SCNC: 4.3 MMOL/L (ref 3.5–5.1)
PROT SERPL-MCNC: 7.5 G/DL (ref 6.4–8.2)
PROT UR STRIP.AUTO-MCNC: NEGATIVE MG/DL
PSA FREE MFR SERPL: 34 %
PSA FREE SERPL-MCNC: 1.37 NG/ML
PSA SERPL-MCNC: 4.08 NG/ML (ref ?–4)
RBC # BLD AUTO: 4.64 X10(6)UL
RBC UR QL AUTO: NEGATIVE
SODIUM SERPL-SCNC: 137 MMOL/L (ref 136–145)
SP GR UR STRIP.AUTO: 1.02 (ref 1–1.03)
TRIGL SERPL-MCNC: 131 MG/DL (ref 30–149)
TSI SER-ACNC: 3.02 MIU/ML (ref 0.36–3.74)
UROBILINOGEN UR STRIP.AUTO-MCNC: NORMAL MG/DL
VIT D+METAB SERPL-MCNC: 30.6 NG/ML (ref 30–100)
VLDLC SERPL CALC-MCNC: 21 MG/DL (ref 0–30)
WBC # BLD AUTO: 4.9 X10(3) UL (ref 4–11)

## 2023-08-18 PROCEDURE — 84443 ASSAY THYROID STIM HORMONE: CPT | Performed by: FAMILY MEDICINE

## 2023-08-18 PROCEDURE — 84153 ASSAY OF PSA TOTAL: CPT | Performed by: FAMILY MEDICINE

## 2023-08-18 PROCEDURE — 83036 HEMOGLOBIN GLYCOSYLATED A1C: CPT | Performed by: FAMILY MEDICINE

## 2023-08-18 PROCEDURE — 80053 COMPREHEN METABOLIC PANEL: CPT | Performed by: FAMILY MEDICINE

## 2023-08-18 PROCEDURE — 80061 LIPID PANEL: CPT | Performed by: FAMILY MEDICINE

## 2023-08-18 PROCEDURE — 84154 ASSAY OF PSA FREE: CPT | Performed by: FAMILY MEDICINE

## 2023-08-18 PROCEDURE — 82306 VITAMIN D 25 HYDROXY: CPT

## 2023-08-18 PROCEDURE — 85025 COMPLETE CBC W/AUTO DIFF WBC: CPT | Performed by: FAMILY MEDICINE

## 2023-08-18 PROCEDURE — 93010 ELECTROCARDIOGRAM REPORT: CPT | Performed by: INTERNAL MEDICINE

## 2023-08-18 PROCEDURE — 93005 ELECTROCARDIOGRAM TRACING: CPT

## 2023-08-18 PROCEDURE — 81003 URINALYSIS AUTO W/O SCOPE: CPT | Performed by: FAMILY MEDICINE

## 2023-08-18 PROCEDURE — 36415 COLL VENOUS BLD VENIPUNCTURE: CPT | Performed by: FAMILY MEDICINE

## 2023-08-18 RX ORDER — FLUTICASONE PROPIONATE 50 MCG
2 SPRAY, SUSPENSION (ML) NASAL DAILY
Qty: 48 G | Refills: 2 | Status: SHIPPED | OUTPATIENT
Start: 2023-08-18

## 2023-08-18 RX ORDER — ZOLPIDEM TARTRATE 10 MG/1
10 TABLET ORAL NIGHTLY
Qty: 90 TABLET | Refills: 1 | Status: SHIPPED | OUTPATIENT
Start: 2023-08-18

## 2023-08-19 LAB
ATRIAL RATE: 45 BPM
P AXIS: 54 DEGREES
P-R INTERVAL: 182 MS
Q-T INTERVAL: 444 MS
QRS DURATION: 84 MS
QTC CALCULATION (BEZET): 384 MS
R AXIS: -2 DEGREES
T AXIS: 27 DEGREES
VENTRICULAR RATE: 45 BPM

## 2023-08-29 ENCOUNTER — OFFICE VISIT (OUTPATIENT)
Dept: FAMILY MEDICINE CLINIC | Facility: CLINIC | Age: 68
End: 2023-08-29

## 2023-08-29 VITALS
HEIGHT: 64 IN | SYSTOLIC BLOOD PRESSURE: 126 MMHG | WEIGHT: 161 LBS | DIASTOLIC BLOOD PRESSURE: 75 MMHG | BODY MASS INDEX: 27.49 KG/M2 | TEMPERATURE: 97 F | HEART RATE: 65 BPM

## 2023-08-29 DIAGNOSIS — R97.20 ELEVATED PSA: ICD-10-CM

## 2023-08-29 DIAGNOSIS — E55.9 VITAMIN D DEFICIENCY: ICD-10-CM

## 2023-08-29 DIAGNOSIS — C61 PROSTATE CANCER (HCC): Primary | ICD-10-CM

## 2023-08-29 DIAGNOSIS — T78.40XD ALLERGY, SUBSEQUENT ENCOUNTER: ICD-10-CM

## 2023-08-29 PROCEDURE — 3078F DIAST BP <80 MM HG: CPT | Performed by: FAMILY MEDICINE

## 2023-08-29 PROCEDURE — 1160F RVW MEDS BY RX/DR IN RCRD: CPT | Performed by: FAMILY MEDICINE

## 2023-08-29 PROCEDURE — 99213 OFFICE O/P EST LOW 20 MIN: CPT | Performed by: FAMILY MEDICINE

## 2023-08-29 PROCEDURE — 3008F BODY MASS INDEX DOCD: CPT | Performed by: FAMILY MEDICINE

## 2023-08-29 PROCEDURE — 1159F MED LIST DOCD IN RCRD: CPT | Performed by: FAMILY MEDICINE

## 2023-08-29 PROCEDURE — 3074F SYST BP LT 130 MM HG: CPT | Performed by: FAMILY MEDICINE

## 2023-08-29 RX ORDER — DESLORATADINE 5 MG/1
5 TABLET ORAL DAILY
Qty: 30 TABLET | Refills: 1 | Status: SHIPPED | OUTPATIENT
Start: 2023-08-29 | End: 2024-08-23

## 2023-08-29 RX ORDER — ERGOCALCIFEROL 1.25 MG/1
50000 CAPSULE ORAL WEEKLY
Qty: 12 CAPSULE | Refills: 4 | Status: SHIPPED | OUTPATIENT
Start: 2023-08-29 | End: 2023-09-28

## 2023-08-30 RX ORDER — DESLORATADINE 5 MG/1
5 TABLET ORAL DAILY
Qty: 90 TABLET | Refills: 3 | OUTPATIENT
Start: 2023-08-30 | End: 2024-08-24

## 2023-10-02 ENCOUNTER — OFFICE VISIT (OUTPATIENT)
Dept: ALLERGY | Facility: CLINIC | Age: 68
End: 2023-10-02

## 2023-10-02 VITALS
WEIGHT: 161 LBS | HEIGHT: 64 IN | HEART RATE: 56 BPM | SYSTOLIC BLOOD PRESSURE: 127 MMHG | OXYGEN SATURATION: 97 % | BODY MASS INDEX: 27.49 KG/M2 | DIASTOLIC BLOOD PRESSURE: 74 MMHG

## 2023-10-02 DIAGNOSIS — L50.9 URTICARIA: ICD-10-CM

## 2023-10-02 DIAGNOSIS — Z92.29 COVID-19 VACCINE SERIES COMPLETED: ICD-10-CM

## 2023-10-02 DIAGNOSIS — L29.9 PRURITUS: Primary | ICD-10-CM

## 2023-10-02 DIAGNOSIS — Z23 FLU VACCINE NEED: ICD-10-CM

## 2023-10-02 PROCEDURE — 3078F DIAST BP <80 MM HG: CPT | Performed by: ALLERGY & IMMUNOLOGY

## 2023-10-02 PROCEDURE — 99214 OFFICE O/P EST MOD 30 MIN: CPT | Performed by: ALLERGY & IMMUNOLOGY

## 2023-10-02 PROCEDURE — 3008F BODY MASS INDEX DOCD: CPT | Performed by: ALLERGY & IMMUNOLOGY

## 2023-10-02 PROCEDURE — 3074F SYST BP LT 130 MM HG: CPT | Performed by: ALLERGY & IMMUNOLOGY

## 2023-10-02 PROCEDURE — 1159F MED LIST DOCD IN RCRD: CPT | Performed by: ALLERGY & IMMUNOLOGY

## 2023-10-02 RX ORDER — LEVOCETIRIZINE DIHYDROCHLORIDE 5 MG/1
5 TABLET, FILM COATED ORAL DAILY
Qty: 90 TABLET | Refills: 2 | Status: SHIPPED | OUTPATIENT
Start: 2023-10-02

## 2023-10-02 RX ORDER — TRIAMCINOLONE ACETONIDE 1 MG/G
OINTMENT TOPICAL
Qty: 60 G | Refills: 0 | Status: SHIPPED | OUTPATIENT
Start: 2023-10-02

## 2023-10-02 RX ORDER — HYDROXYZINE HYDROCHLORIDE 10 MG/1
10 TABLET, FILM COATED ORAL 3 TIMES DAILY PRN
Qty: 90 TABLET | Refills: 1 | Status: SHIPPED | OUTPATIENT
Start: 2023-10-02

## 2023-10-02 NOTE — PROGRESS NOTES
Morgan Draper is a 69year old male. HPI:   Patient presents with:  Rash: Patient presents for follow up for rash on upper arms and chest- states rash is better. Patient is a 68-year-old male who presents for follow-up with a chief complaint of pruritus and hives  Patient last seen by me in September 2022  Prior skin testing to environmental allergens and common food allergens was negative  COVID-vaccine x2 doses. Pneumovax up-to-date 2020    Medications listed include Clarinex Flonase Xyzal    Today patient reports      Hives  Active or persistent symptoms: worseafter heat and shower  No rash today  ED visits or prednisone in the interim: denies  Active meds: xyzal, atarax prn,         HISTORY:  Past Medical History:   Diagnosis Date    Cancer ([address]     Prostate cancer 2013       Past Surgical History:   Procedure Laterality Date    CHOLECYSTECTOMY  2012    COLONOSCOPY      2018    HEMORRHOIDECTOMY      1991    LAPAROSCOPIC CHOLECYSTECTOMY  2012      Family History   Problem Relation Age of Onset    Cancer Father         Prostate cancer    Cancer Brother         Prosate cancer      Social History:   Social History     Socioeconomic History    Marital status:    Tobacco Use    Smoking status: Never     Passive exposure: Never    Smokeless tobacco: Never   Vaping Use    Vaping Use: Never used   Substance and Sexual Activity    Alcohol use: Not Currently    Drug use: Never        Medications (Active prior to today's visit):  Current Outpatient Medications   Medication Sig Dispense Refill    hydrOXYzine 10 MG Oral Tab Take 1 tablet (10 mg total) by mouth 3 (three) times daily as needed for Itching. 90 tablet 1    levocetirizine (XYZAL) 5 MG Oral Tab Take 1 tablet (5 mg total) by mouth daily. 90 tablet 2    triamcinolone 0.1 % External Ointment Applied 2 times per day as needed 60 g 0    zolpidem 10 MG Oral Tab Take 1 tablet (10 mg total) by mouth nightly.  90 tablet 1    fluticasone propionate 50 MCG/ACT Nasal Suspension 2 sprays by Nasal route daily. 48 g 2    desloratadine (CLARINEX) 5 MG Oral Tab Take 1 tablet (5 mg total) by mouth daily. 90 tablet 3    finasteride 5 MG Oral Tab Take 1 tablet (5 mg total) by mouth daily. tamsulosin (FLOMAX) cap Take 1 capsule (0.4 mg total) by mouth daily. Omeprazole 40 MG Oral Capsule Delayed Release Take 1 capsule (40 mg total) by mouth daily. (Patient not taking: Reported on 10/2/2023) 90 capsule 1    hydrOXYzine 25 MG Oral Tab Take 1 tablet (25 mg total) by mouth nightly as needed for Itching.  (Patient not taking: Reported on 8/18/2023) 90 tablet 1       Allergies:  No Known Allergies      ROS:   Allergic/Immuno:  See hpi  Cardiovascular:  Negative for irregular heartbeat/palpitations, chest pain, edema  Constitutional:  Negative night sweats,weight loss, irritability and lethargy  ENMT:  Negative for ear drainage, hearing loss and nasal drainage  Eyes:  Negative for eye discharge and vision loss  Gastrointestinal:  Negative for abdominal pain, diarrhea and vomiting  Integumentary:  Negative for pruritus and rash  Respiratory:  Negative for cough, dyspnea and wheezing    PHYSICAL EXAM:   Constitutional: responsive, no acute distress noted  Head/Face: NC/Atraumatic  Eyes/Vision: conjunctiva and lids are normal extraocular motion is intact   Ears/Audiometry: tympanic membranes are normal bilaterally hearing is grossly intact  Nose/Mouth/Throat: nose and throat are clear mucous membranes are moist   Neck/Thyroid: neck is supple without adenopathy  Lymphatic: no abnormal cervical, supraclavicular or axillary adenopathy is noted  Respiratory: normal to inspection lungs are clear to auscultation bilaterally normal respiratory effort   Cardiovascular: regular rate and rhythm no murmurs, gallups, or rubs  Abdomen: soft non-tender non-distended  Skin/Hair: no unusual rashes present   Extremities: no edema, cyanosis, or clubbing     ASSESSMENT/PLAN:   Assessment   Pruritus (primary encounter diagnosis)  Urticaria  Covid-19 vaccine series completed  Flu vaccine need    #1 pruritus and urticaria  Stable at this time with Xyzal once a day and hydroxyzine as needed. Patient requested decrease in hydroxyzine dosing down from 25 mg. Continue with Xyzal once a day up to twice a day if needed  Prescription for hydroxyzine 10 mg every 4-6 hours as needed provided. Recommend moisturizing with Cetaphil CeraVe or Vanicream    #2 COVID-vaccine x2 doses. Recommend booster this fall. #3 flu vaccine recommended this fall with high-dose. #4 pneumonia vaccine up-to-date. Follow-up in 1 year or sooner if needed    Over 30 spent on care and visit          Orders This Visit:  No orders of the defined types were placed in this encounter. Meds This Visit:  Requested Prescriptions     Signed Prescriptions Disp Refills    hydrOXYzine 10 MG Oral Tab 90 tablet 1     Sig: Take 1 tablet (10 mg total) by mouth 3 (three) times daily as needed for Itching. levocetirizine (XYZAL) 5 MG Oral Tab 90 tablet 2     Sig: Take 1 tablet (5 mg total) by mouth daily. triamcinolone 0.1 % External Ointment 60 g 0     Sig: Applied 2 times per day as needed       Imaging & Referrals:  None     10/2/2023  Dane Romero MD    If medication samples were provided today, they were provided solely for patient education and training related to self administration of these medications. Teaching, instruction and sample was provided to the patient by myself. Teaching included  a review of potential adverse side effects as well as potential efficacy. Patient's questions were answered in regards to medication administration and dosing and potential side effects.  Teaching was provided via the teach back method

## 2023-10-02 NOTE — PATIENT INSTRUCTIONS
1 pruritus and urticaria  Stable at this time with Xyzal once a day and hydroxyzine as needed. Patient requested decrease in hydroxyzine dosing down from 25 mg. Continue with Xyzal once a day up to twice a day if needed  Prescription for hydroxyzine 10 mg every 4-6 hours as needed provided. Recommend moisturizing with Cetaphil CeraVe or Vanicream    #2 COVID-vaccine x2 doses. Recommend booster this fall. #3 flu vaccine recommended this fall with high-dose. #4 pneumonia vaccine up-to-date.       Follow-up in 1 year or sooner if needed

## 2023-11-08 ENCOUNTER — HOSPITAL ENCOUNTER (OUTPATIENT)
Age: 68
Discharge: HOME | End: 2023-11-08
Payer: MEDICARE

## 2023-11-08 ENCOUNTER — NURSE TRIAGE (OUTPATIENT)
Dept: FAMILY MEDICINE CLINIC | Facility: CLINIC | Age: 68
End: 2023-11-08

## 2023-11-08 VITALS
WEIGHT: 160 LBS | TEMPERATURE: 98 F | BODY MASS INDEX: 26.66 KG/M2 | RESPIRATION RATE: 18 BRPM | HEART RATE: 56 BPM | DIASTOLIC BLOOD PRESSURE: 76 MMHG | SYSTOLIC BLOOD PRESSURE: 116 MMHG | HEIGHT: 65 IN | OXYGEN SATURATION: 96 %

## 2023-11-08 DIAGNOSIS — L08.9: Primary | ICD-10-CM

## 2023-11-08 DIAGNOSIS — S90.522A: Primary | ICD-10-CM

## 2023-11-08 NOTE — ED INITIAL ASSESSMENT (HPI)
Pt presents to IC for wound check to top of lt foot that has been ongoing for one week   No drainage + red and painful

## 2023-11-08 NOTE — TELEPHONE ENCOUNTER
Action Requested: Summary for Provider     []  Critical Lab, Recommendations Needed  [] Need Additional Advice  []   FYI    []   Need Orders  [] Need Medications Sent to Pharmacy  []  Other     SUMMARY: Advised per protocol: see in office today. No appointments available. Recommended [facility]. Patient states he will go to the  at [facility] since he is familiar with the location. Reason for call: Infection  Onset: 1 week     With [address] ID# [id number], Patient calling (identified name and ) states he thinks he developed an infection on the top of his left foot after using a cream for arthritis (Cream Dragon) bought OTC. States the area is painful and red, skin is also open about 3/4 inch round. Area is dry and hard, slightly swollen, without drainage, denies fevers. States the original skin has come off. See care advise provided. Patient verbalized understanding and agrees with plan.        Reason for Disposition   Patient wants to be seen    Protocols used: Boil (Skin Abscess)-A-OH

## 2023-11-24 RX ORDER — HYDROXYZINE HYDROCHLORIDE 10 MG/1
10 TABLET, FILM COATED ORAL 3 TIMES DAILY PRN
Qty: 90 TABLET | Refills: 1 | Status: SHIPPED | OUTPATIENT
Start: 2023-11-24

## 2023-11-24 NOTE — TELEPHONE ENCOUNTER
Requested Prescriptions   Pending Prescriptions Disp Refills    HYDROXYZINE 10 MG Oral Tab [Pharmacy Med Name: HYDROXYZINE HCL 10MG TABLETS] 90 tablet 1     Sig: TAKE 1 TABLET(10 MG) BY MOUTH THREE TIMES DAILY AS NEEDED FOR ITCHING       Antihistamines Passed - 11/23/2023  1:44 PM        Passed - Appt in past 12 mos or next 1 mos     Recent Outpatient Visits              1 month ago Pruritus    Hans Abebe, Chiara Newton MD    Office Visit    2 months ago Prostate cancer [facility])    [employer]    Office Visit    3 months ago Medicare annual wellness visit, subsequent    Lillian [facility], MD    Office Visit    10 months ago Non-seasonal allergic rhinitis, unspecified trigger    Lorraine Bravo MD    Office Visit    1 year ago Medicare annual wellness visit, subsequent    Lorraine Bravo MD    Office Visit          Future Appointments         Provider Department Appt Notes    In 3 months MD Kristian Still [place] St[facility]

## 2024-02-05 ENCOUNTER — LAB ENCOUNTER (OUTPATIENT)
Dept: LAB | Age: 69
End: 2024-02-05
Attending: UROLOGY
Payer: MEDICARE

## 2024-02-05 DIAGNOSIS — C61 PROSTATE CANCER (HCC): Primary | ICD-10-CM

## 2024-02-05 LAB — PSA SERPL-MCNC: 1.9 NG/ML (ref ?–4)

## 2024-02-05 PROCEDURE — 84153 ASSAY OF PSA TOTAL: CPT

## 2024-02-05 PROCEDURE — 36415 COLL VENOUS BLD VENIPUNCTURE: CPT

## 2024-02-05 RX ORDER — HYDROXYZINE HYDROCHLORIDE 10 MG/1
10 TABLET, FILM COATED ORAL 3 TIMES DAILY PRN
Qty: 90 TABLET | Refills: 0 | Status: SHIPPED | OUTPATIENT
Start: 2024-02-05

## 2024-02-05 NOTE — TELEPHONE ENCOUNTER
Refill requested for   Requested Prescriptions   Pending Prescriptions Disp Refills    HYDROXYZINE 10 MG Oral Tab [Pharmacy Med Name: HYDROXYZINE HCL 10MG TABLETS] 90 tablet 1     Sig: TAKE 1 TABLET(10 MG) BY MOUTH THREE TIMES DAILY AS NEEDED FOR ITCHING       Antihistamines Passed - 2/3/2024  2:38 PM        Passed - Appt in past 12 mos or next 1 mos     Recent Outpatient Visits              4 months ago Pruritus    [facility] [place]    Office Visit    5 months ago [facility]state [facility] ([facility]    Office Visit    5 months ago Medicare annual wellness visit, subsequent    [facility]    Office Visit    1 year ago Non-seasonal allergic rhinitis, unspecified trigger    [facility]    Office Visit    1 year ago Medicare annual wellness visit, subsequent    [facility]    Office Visit          Future Appointments         Provider Department Appt Notes    In 2 weeks Timothy Lara MD [facility]     In 3 weeks Sharad Du MD [facility] [address], [place] MEDIC[facility] [place]                   Last office visit: 10/02/23    Previously advised to follow up in 1 year or sooner if needed    F/U currently scheduled? Not at this time    ACTION: Refill filled per protocol

## 2024-02-23 ENCOUNTER — TELEPHONE (OUTPATIENT)
Dept: GASTROENTEROLOGY | Facility: CLINIC | Age: 69
End: 2024-02-23

## 2024-02-23 ENCOUNTER — OFFICE VISIT (OUTPATIENT)
Dept: GASTROENTEROLOGY | Facility: CLINIC | Age: 69
End: 2024-02-23

## 2024-02-23 VITALS
WEIGHT: 162 LBS | DIASTOLIC BLOOD PRESSURE: 74 MMHG | SYSTOLIC BLOOD PRESSURE: 121 MMHG | HEIGHT: 65 IN | HEART RATE: 60 BPM | BODY MASS INDEX: 26.99 KG/M2

## 2024-02-23 DIAGNOSIS — Z12.11 COLON CANCER SCREENING: ICD-10-CM

## 2024-02-23 DIAGNOSIS — K21.9 GASTROESOPHAGEAL REFLUX DISEASE, UNSPECIFIED WHETHER ESOPHAGITIS PRESENT: Primary | ICD-10-CM

## 2024-02-23 DIAGNOSIS — R07.89 ATYPICAL CHEST PAIN: ICD-10-CM

## 2024-02-23 DIAGNOSIS — Z12.11 ENCOUNTER FOR SCREENING COLONOSCOPY: ICD-10-CM

## 2024-02-23 PROCEDURE — 3078F DIAST BP <80 MM HG: CPT | Performed by: INTERNAL MEDICINE

## 2024-02-23 PROCEDURE — 1159F MED LIST DOCD IN RCRD: CPT | Performed by: INTERNAL MEDICINE

## 2024-02-23 PROCEDURE — 3074F SYST BP LT 130 MM HG: CPT | Performed by: INTERNAL MEDICINE

## 2024-02-23 PROCEDURE — 3008F BODY MASS INDEX DOCD: CPT | Performed by: INTERNAL MEDICINE

## 2024-02-23 PROCEDURE — 99204 OFFICE O/P NEW MOD 45 MIN: CPT | Performed by: INTERNAL MEDICINE

## 2024-02-23 PROCEDURE — 1126F AMNT PAIN NOTED NONE PRSNT: CPT | Performed by: INTERNAL MEDICINE

## 2024-02-23 RX ORDER — POLYETHYLENE GLYCOL 3350, SODIUM SULFATE ANHYDROUS, SODIUM BICARBONATE, SODIUM CHLORIDE, POTASSIUM CHLORIDE 236; 22.74; 6.74; 5.86; 2.97 G/4L; G/4L; G/4L; G/4L; G/4L
4 POWDER, FOR SOLUTION ORAL ONCE
Qty: 1 EACH | Refills: 0 | Status: SHIPPED | OUTPATIENT
Start: 2024-02-23 | End: 2024-02-23

## 2024-02-23 RX ORDER — ERGOCALCIFEROL 1.25 MG/1
50000 CAPSULE ORAL WEEKLY
COMMUNITY
Start: 2024-02-15

## 2024-02-23 NOTE — PATIENT INSTRUCTIONS
Schedule EGD & colonoscopy exam at [facility] ([facility])    BMI Readings from Last 1 Encounters:   02/23/24 26.96 kg/m²      MAC anesthesia     Golytely (PEG) 4L bowel prep  Rx sent in to [facility]    Dx = GERD symptoms, atypical chest pain, screening colonoscopy examination    Medication instructions:    None    Hold Cialis/Tadalafil or Viagra/sildenafil or Levitra/Vardenafil, Stendra/Stendra medication > 3 days prior to procedure

## 2024-02-23 NOTE — PROGRESS NOTES
HPI:    Patient ID: Pranav Desai is a 68 year old man with BMI 27, distant history prostate cancer and apparently history of cholecystectomy 2012, ERCP 2013 in the [facility].    Last [place] colonoscopy examination appears to have been 2013.  Path report but not operative report copied below.  He underwent prostate biopsies in 2016 and 2018.    Mr. Desai is referred to us by Dr. Sharad Du to discuss his next screening colonoscopy examination.    On review of systems, Mr. Desai is most concerned with GERD symptoms and subjective allergies.    1.  Mr. Desai describes over 5 years of burning heartburn GERD symptoms.    Symptoms are associated with late-night meals, large heavy meals.    No associated dysphagia.    Takes omeprazole every day with relief.    2.  Suspected atypical allergies    Mr. Desai describes a syndrome of burning chest pain radiating up to his shoulders and especially his ears provoked by triggers including dairy, meats, sugar, citrus.    There is pruritus with this heat sensation.    He is seeing Dr. Barclay of allergy.  He has been prescribed Xyzal medication [id number]    3.  Erratic bowel patterns    Mr. Desai describes intermittent constipation and diarrhea.  Does not take anything regularly for that concern.      No family history colorectal cancer    Never smoker    Wt Readings from Last 20 Encounters:   02/23/24 162 lb (73.5 kg)   11/08/23 160 lb (72.6 kg)   10/02/23 161 lb (73 kg)   08/29/23 161 lb (73 kg)   08/18/23 159 lb 6.4 oz (72.3 kg)   01/17/23 161 lb 9.6 oz (73.3 kg)   10/11/22 160 lb 3.2 oz (72.7 kg)   09/07/22 158 lb (71.7 kg)   08/11/22 160 lb 9.6 oz (72.8 kg)   05/17/22 168 lb (76.2 kg)   03/14/22 170 lb 6.4 oz (77.3 kg)   03/02/22 172 lb (78 kg)   02/02/22 172 lb (78 kg)   01/26/22 172 lb (78 kg)   12/08/21 173 lb 4 oz (78.6 kg)         Previous EGD examination: ?  Previous colonoscopy(ies): 2013    [facility]    Review of Systems    ======================      Patient  Name:  ELENA GILLESPIE  Specimen #:  [id number]  Med. Rec. #:[id number]  Obtained:  10/7/2013  Reported: 10/9/2013    Specimen(s) Received  Random colon biopsy    FINAL DIAGNOSIS  a. Random Colon; BIOPSY:  -Colonic mucosa with mild focal active colitis  -See comment      COMMENT: The sections of the colonic mucosa show two small foci of mild active  colitis characterized by rare areas of cryptitis without gland distortion.  Differential diagnosis of focal active colitis includes; reaction to the bowel  preparation, a reaction to certain drugs (NSAIDs) and a resolving infection. No  granulomas are identified.    Primary Pathologist:  Daniella Garcia MD    **Report Electronically Signed**  Daniella Garcia MD    Operation  Colonoscopy    Clinical History  Diarrhea/constipation  Screening        Current Outpatient Medications   Medication Sig Dispense Refill    hydrOXYzine 10 MG Oral Tab TAKE 1 TABLET(10 MG) BY MOUTH THREE TIMES DAILY AS NEEDED FOR ITCHING 90 tablet 0    levocetirizine (XYZAL) 5 MG Oral Tab Take 1 tablet (5 mg total) by mouth daily. 90 tablet 2    triamcinolone 0.1 % External Ointment Applied 2 times per day as needed 60 g 0    zolpidem 10 MG Oral Tab Take 1 tablet (10 mg total) by mouth nightly. 90 tablet 1    fluticasone propionate 50 MCG/ACT Nasal Suspension 2 sprays by Nasal route daily. 48 g 2    Omeprazole 40 MG Oral Capsule Delayed Release Take 1 capsule (40 mg total) by mouth daily. (Patient not taking: Reported on 10/2/2023) 90 capsule 1    hydrOXYzine 25 MG Oral Tab Take 1 tablet (25 mg total) by mouth nightly as needed for Itching. (Patient not taking: Reported on 8/18/2023) 90 tablet 1    finasteride 5 MG Oral Tab Take 1 tablet (5 mg total) by mouth daily.      tamsulosin (FLOMAX) cap Take 1 capsule (0.4 mg total) by mouth daily.           Allergies:No Known Allergies  Imaging: No results found.       PHYSICAL EXAM:   Physical Exam           ASSESSMENT/PLAN:     68 year old man with BMI 27,  distant history prostate cancer and apparently history of cholecystectomy 2012, ERCP 2013 in the [facility].    Last [place] colonoscopy examination appears to have been 2013.  Path report but not operative report copied below.  He underwent prostate biopsies in 2016 and 2018.    Mr. Desai is referred to us by Dr. Sharad Du to discuss his next screening colonoscopy examination.    On review of systems, Mr. Desai is most concerned with GERD symptoms and subjective allergies.    1.  Mr. Desai describes over 5 years of burning heartburn GERD symptoms.    Symptoms are associated with late-night meals, large heavy meals.    No associated dysphagia.    Takes omeprazole every day with relief.    2.  Suspected atypical allergies    Mr. Desai describes a syndrome of burning chest pain radiating up to his shoulders and especially his ears provoked by triggers including dairy, meats, sugar, citrus.    There is pruritus with this heat sensation.    He is seeing Dr. Barclay of allergy.  He has been prescribed Xyzal medication [id number]    3.  Erratic bowel patterns    Mr. Desai describes intermittent constipation and diarrhea.  Does not take anything regularly for that concern.      No family history colorectal cancer    Never smoker    Suggest:    GERD symptoms without dysphagia  Describes typical triggers including late meals, large heavy meals, citrus  Controlled on daily omeprazole  Long duration of symptoms, concern for severity of symptoms, need for decision making regarding risks and benefits of long-term PPI use led to our discussing EGD examination today.  Mr. Desai would like to further evaluate with EGD examination, evaluate for hiatal hernia, fibrosis, Felix's esophagus.    2.  Atypical burning chest neck ear pain  Multiple identified subjective allergies  Currently taking daily omeprazole therapy  Seeing Dr. Kendall Barclay of allergy; Xyzal therapy as above    3.  Erratic bowel patterns,  constipation-diarrhea     Unclear relation to somewhat atypical description of food allergies and sensitivities  Consideration for daily bowel regimen, fiber supplement stool softeners, prunes/aloe    4.  Biliary history      s/p cholecystectomy 2012 and ERCP procedure 2013  ERCP procedure appears to been for abdominal pain, impressive spike in LFTs  Operative report is not visible under disorganized \"Care Everywhere\" tab    5.  Colon cancer screening, average risk  Due for average risk screening colonoscopy examination    I recommended and discussed screening colonoscopy examination.  Alternatives including stool testing, imaging briefly discussed.    Consent:    I recommended EGD and colonoscopy examination with possible biopsy, possible polypectomy. We discussed sedation options of conscious sedation versus MAC anesthesia, and agreed on MAC anesthesia. We discussed the nature and risks of EGD and colonoscopy examination including sedation, anesthesia risks; bleeding, colonic injury or perforation, infection.   We discussed the rare occurrence of missed lesions including missed polyps or missed malignancy with colonoscopy examination.  The patient understood these risks and agrees to proceed.  The need for an accompanying adult to provide a ride home or escort home was also discussed.    GoLYTELY bowel prep.              Over 45 minutes spent today discussing the above and counseling this patient, reviewing chart notes and data and composing this note.     Digital transcription software was utilized to produce this note. The note was proofread for content only. Typographical errors may remain.       Meds This Visit:  Requested Prescriptions      No prescriptions requested or ordered in this encounter       Imaging & Referrals:  None       ID#[id number]

## 2024-02-23 NOTE — TELEPHONE ENCOUNTER
Scheduled for:  Colonoscopy 35259/82223,EGD 97101  Provider Name:  Dr. Lara  Date:  8/13/24  Location:[facility]  Sedation:  MAC  Time:  2:30 Pm(Patient is aware arrival time is at 1:30 pm)  Prep:  Golytely,Egd -Npo 3 hrs before prior to procedure   Meds/Allergies Reconciled?:  Physician Reviewed   Diagnosis with codes:  Atypical chest pain R07.89,Gerd K21.9 , Colon cancer screening Z12.11  Was patient informed to call insurance with codes (Y/N):  Yes  Referral sent?:  Referral was sent at the time of electronic surgical scheduling.  EM or [facility] notified?:  I sent an electronic request to Endo Scheduling and received a confirmation today.  Medication Orders:  Pt is aware to NOT take iron pills, herbal meds and diet supplements for 7 days before exam. Also to NOT take any form of alcohol, recreational drugs and any forms of ED meds 24 hours before exam.   None  Hold Cialis/Tadalafil or Viagra/sildenafil or Levitra/Vardenafil, Stendra/Stendra medication > 3 days prior to procedure     Misc Orders:  Patient was informed about the new cancellation policy for his/her procedure. Patient was also given a copy of the cancellation policy at the time of the appointment and verbalized understanding.      Further instructions given by staff:  I provide prep instructions to patient at the time of the appointment and reviewed date, time and location, patient verbalized that he understood and is aware to call if he has any questions.

## 2024-02-26 ENCOUNTER — OFFICE VISIT (OUTPATIENT)
Dept: FAMILY MEDICINE CLINIC | Facility: CLINIC | Age: 69
End: 2024-02-26

## 2024-02-26 VITALS
SYSTOLIC BLOOD PRESSURE: 122 MMHG | HEART RATE: 63 BPM | WEIGHT: 161.81 LBS | HEIGHT: 65 IN | BODY MASS INDEX: 26.96 KG/M2 | DIASTOLIC BLOOD PRESSURE: 74 MMHG

## 2024-02-26 DIAGNOSIS — H53.8 BLURRED VISION: ICD-10-CM

## 2024-02-26 DIAGNOSIS — R97.20 ELEVATED PSA: ICD-10-CM

## 2024-02-26 DIAGNOSIS — Z91.81 RISK FOR FALLS: ICD-10-CM

## 2024-02-26 DIAGNOSIS — H43.399 VITREOUS FLOATERS, UNSPECIFIED LATERALITY: ICD-10-CM

## 2024-02-26 DIAGNOSIS — E55.9 VITAMIN D DEFICIENCY: ICD-10-CM

## 2024-02-26 DIAGNOSIS — M15.9 PRIMARY OSTEOARTHRITIS INVOLVING MULTIPLE JOINTS: ICD-10-CM

## 2024-02-26 DIAGNOSIS — C61 PROSTATE CANCER (HCC): ICD-10-CM

## 2024-02-26 DIAGNOSIS — Z12.11 COLON CANCER SCREENING: ICD-10-CM

## 2024-02-26 DIAGNOSIS — R68.82 DECREASED LIBIDO: ICD-10-CM

## 2024-02-26 DIAGNOSIS — R61 NIGHT SWEATS: ICD-10-CM

## 2024-02-26 DIAGNOSIS — N52.9 ERECTILE DYSFUNCTION, UNSPECIFIED ERECTILE DYSFUNCTION TYPE: ICD-10-CM

## 2024-02-26 DIAGNOSIS — R73.9 HYPERGLYCEMIA: ICD-10-CM

## 2024-02-26 DIAGNOSIS — M85.88 OSTEOPENIA OF LUMBAR SPINE: ICD-10-CM

## 2024-02-26 DIAGNOSIS — F51.01 PRIMARY INSOMNIA: ICD-10-CM

## 2024-02-26 DIAGNOSIS — Z00.00 MEDICARE ANNUAL WELLNESS VISIT, SUBSEQUENT: Primary | ICD-10-CM

## 2024-02-26 NOTE — PROGRESS NOTES
Subjective:   Pranav Desai is a 68 year old male who presents for a Medicare Subsequent Annual Wellness visit (Pt already had Initial Annual Wellness) and scheduled follow up of multiple significant but stable problems.     Pranav Desai is a 68 year old male is here for routine periodic health screening and examination.  His last physical exam was 1 years ago.  His last ECG was none . His last diabetes screening test was none.   His last cholesterol test was uncertain year ago and was normal.  Last dentist visit was > 48 months ago.  He is not current with his Td immunization, 2023  He is current with his Flu vaccination and is interested in receiving it today.  Patient last colonoscopy was 8 years ago. He is not a smoker.        History/Other:   Fall Risk Assessment:   He has been screened for Falls and is low risk.      Cognitive Assessment:   He had a completely normal cognitive assessment - see flowsheet entries       Functional Ability/Status:   Pranav Desai has a completely normal functional assessment. See flowsheet for details.      Depression Screening (PHQ-2/PHQ-9): PHQ-2 SCORE: 0  , done 2/26/2024             Advanced Directives:   He does NOT have a Living Will. [Do you have a living will?: No]  He does NOT have a Power of  for Health Care. [Do you have a healthcare power of ?: No]  Discussed Advance Care Planning with patient (and family/surrogate if present). Standard forms made available to patient in After Visit Summary.      There is no problem list on file for this patient.    Allergies:  He has No Known Allergies.    Current Medications:  No outpatient medications have been marked as taking for the 2/26/24 encounter (Office Visit) with Sharad Du MD.       Medical History:  He  has a past medical history of Cancer (HCC).  Surgical History:  He  has a past surgical history that includes hemorrhoidectomy; Laparoscopic cholecystectomy (2012); colonoscopy (10/2013); and  cholecystectomy (2012).   Family History:  His family history includes Cancer in his brother and father.  Social History:  He  reports that he has never smoked. He has never been exposed to tobacco smoke. He has never used smokeless tobacco. He reports that he does not currently use alcohol. He reports that he does not use drugs.    Tobacco:  He has never smoked tobacco.    CAGE Alcohol Screen:   CAGE screening score of 0 on 2/26/2024, showing low risk of alcohol abuse.      Patient Care Team:  Sharad Du MD as PCP - General (Family Medicine)    Review of Systems   Constitutional:  Negative for appetite change, fatigue and fever.   HENT:  Negative for hearing loss and nosebleeds.    Eyes:  Negative for pain and visual disturbance.   Respiratory:  Negative for apnea and shortness of breath.    Cardiovascular:  Negative for chest pain, palpitations and leg swelling.   Gastrointestinal:  Negative for abdominal pain, blood in stool, constipation, diarrhea, nausea and vomiting.   Endocrine: Negative for polydipsia and polyuria.   Genitourinary:  Negative for decreased urine volume, frequency and hematuria.        No nocturia   Musculoskeletal:  Negative for arthralgias.   Skin:  Negative for rash.   Neurological:  Negative for dizziness, syncope and headaches.   Psychiatric/Behavioral:  Negative for dysphoric mood and sleep disturbance.           Objective:   Physical Exam  Vitals reviewed.   Constitutional:       Appearance: Normal appearance.      Comments:      HENT:      Head: Normocephalic.      Right Ear: Hearing, tympanic membrane and ear canal normal.      Left Ear: Hearing, tympanic membrane and ear canal normal.      Nose: Nose normal. No rhinorrhea.      Mouth/Throat:      Mouth: Mucous membranes are moist.      Pharynx: Oropharynx is clear.   Eyes:      Extraocular Movements: Extraocular movements intact.      Conjunctiva/sclera: Conjunctivae normal.      Pupils: Pupils are equal, round, and reactive  to light.   Neck:      Thyroid: No thyroid mass.      Vascular: No carotid bruit.   Cardiovascular:      Rate and Rhythm: Normal rate and regular rhythm.      Heart sounds: Normal heart sounds, S1 normal and S2 normal. No murmur heard.  Pulmonary:      Effort: Pulmonary effort is normal.      Breath sounds: Normal breath sounds.   Abdominal:      General: Abdomen is flat. Bowel sounds are normal.      Palpations: Abdomen is soft. There is no hepatomegaly, splenomegaly or mass.      Tenderness: There is no abdominal tenderness.      Hernia: No hernia is present.   Musculoskeletal:      Cervical back: Neck supple.      Comments: Spinal exam without scoliosis.      Lymphadenopathy:      Cervical: No cervical adenopathy.   Skin:     General: Skin is warm.      Findings: No rash.   Neurological:      General: No focal deficit present.      Mental Status: He is alert.      Deep Tendon Reflexes:      Reflex Scores:       Patellar reflexes are 2+ on the right side and 2+ on the left side.  Psychiatric:         Attention and Perception: Attention normal.         Mood and Affect: Mood and affect normal.          /74   Pulse 63   Ht 5' 5\" (1.651 m)   Wt 161 lb 12.8 oz (73.4 kg)   BMI 26.92 kg/m²  Estimated body mass index is 26.92 kg/m² as calculated from the following:    Height as of this encounter: 5' 5\" (1.651 m).    Weight as of this encounter: 161 lb 12.8 oz (73.4 kg).    Medicare Hearing Assessment:   Hearing Screening    Screening Method: Questionnaire  I have a problem hearing over the telephone: No I have trouble following the conversations when two or more people are talking at the same time: No   I have trouble understanding things on the TV: No I have to strain to understand conversations: No   I have to worry about missing the telephone ring or doorbell: No I have trouble hearing conversations in a noisy background such as a crowded room or restaurant: No   I get confused about where sounds come from:  No I misunderstand some words in a sentence and need to ask people to repeat themselves: No   I especially have trouble understanding the speech of women and children: No I have trouble understanding the speaker in a large room such as at a meeting or place of [place]: No   Many people I talk to seem to mumble (or don't speak clearly): No People get annoyed because I misunderstand what they say: No   I misunderstand what others are saying and make inappropriate responses: No I avoid social activities because I cannot hear well and fear I will reply improperly: No   Family members and friends have told me they think I may have hearing loss: No             Visual Acuity:   Right Eye Visual Acuity: Uncorrected Right Eye Chart Acuity: 20/20   Left Eye Visual Acuity: Uncorrected Left Eye Chart Acuity: 20/20   Both Eyes Visual Acuity: Uncorrected Both Eyes Chart Acuity: 20/25   Able To Tolerate Visual Acuity: Yes        Assessment & Plan:   Pranav Desai is a 68 year old male who presents for a Medicare Assessment.     1. Medicare annual wellness visit, subsequent (Primary)  -     Lipid Panel; Future; Expected date: 02/26/2024  -     Cancel: EKG 12 Lead; Future; Expected date: 02/26/2024  2. Prostate cancer (HCC)  -     CBC With Differential With Platelet; Future; Expected date: 02/26/2024  -     Comp Metabolic Panel (14); Future; Expected date: 02/26/2024  -     PSA, Total W Reflex To Free; Future; Expected date: 02/26/2024  -     Urinalysis with Culture Reflex; Future; Expected date: 02/26/2024  3. Elevated PSA  4. Decreased libido  5. Erectile dysfunction, unspecified erectile dysfunction type  6. Primary insomnia  7. Hyperglycemia  -     Hemoglobin A1C; Future; Expected date: 02/26/2024  -     TSH W Reflex To Free T4; Future; Expected date: 02/26/2024  8. Primary osteoarthritis involving multiple joints  9. Colon cancer screening  10. Vitreous floaters, unspecified laterality  11. Blurred vision  12. Night sweats  13.  Osteopenia of lumbar spine  -     XR DEXA BONE DENSITOMETRY (CPT=77080); Future; Expected date: 02/26/2024  14. Vitamin D deficiency  -     Vitamin D; Future; Expected date: 02/26/2024  15. Risk for falls    1. Medicare annual wellness visit, subsequent    CPE PLAN:    LABS / TEST & ORDERS for today's visit :Blood test(s) ordered today for send out to Health system lab:  Orders Placed This Encounter   Procedures    CBC With Differential With Platelet    Comp Metabolic Panel (14)    Hemoglobin A1C    Lipid Panel    PSA, Total W Reflex To Free    TSH W Reflex To Free T4    Vitamin D    Urinalysis with Culture Reflex    Referrals: XR DEXA BONE DENSITOMETRY (CPT=77080)  In-House; Urine dip.  Test/Procedures done today include: EKG.    IMMUNIZATIONS: none given today.    RECOMMENDATIONS given include: ANTICIPATORY GUIDANCE  topics covered today include: safety (i.e. seat belts, helmets, sunscreen, protective sports gear ), nutrition (i.e. healthy meals and snacks (i.e. avoid junk food and high-carbohydrate foods); athletic conditioning, fluids; low fat milk, limit to less than 20 oz. a day; dental care ), and Healthy habits& Social competence & Responsibilities: Recommendations on physical activity; exercise daily or at least 3 times a week for 30-60 minutes doing cardiovascular exercise. Encourage to maintain the best physical and dental hygiene possible.   FOLLOW-UP: Schedule a follow-up visit in 12 months.       2. Prostate cancer (HCC)  Ding okay  Follow up with Urologist  Lab:   - CBC With Differential With Platelet; Future  - Comp Metabolic Panel (14); Future  - PSA, Total W Reflex To Free; Future  - Urinalysis with Culture Reflex; Future    3. Elevated PSA  Lab: PSA    4. Decreased libido  CPM    5. Erectile dysfunction, unspecified erectile dysfunction type  CPM    6. Primary insomnia  Doing well  CPM   Follow up [facility]    7. Hyperglycemia  Lab:  - Hemoglobin A1C; Future  - TSH W Reflex To Free T4; Future    8.  Primary osteoarthritis involving multiple joints  Doing well  CPM   Follow up [facility]    9. Colon cancer screening  Schedule for colonoscopy this August    10. Vitreous floaters, unspecified laterality  11. Blurred vision  Stable  CPM    12. Night sweats  Resolved    13. Osteopenia of lumbar spine  Referral:  XR DEXA BONE DENSITOMETRY (CPT=77080); Future    14. Vitamin D deficiency  Lab:  Vitamin D; Future    15. Risk for falls  Moderate risk, precautions given     The patient indicates understanding of these issues and agrees to the plan.  Further testing ordered.  Imaging studies ordered.  Lab work ordered.  Reinforced healthy diet, lifestyle, and exercise.      Return in about 6 months (around 8/29/2024).     XUAN SANDOVAL MD, 2/26/2024     Supplementary Documentation:   General Health:  In the past six months, have you lost more than 10 pounds without trying?: 2 - No  Has your appetite been poor?: No  Type of Diet: Balanced  How does the patient maintain a good energy level?: Appropriate Exercise;Daily Walks  How would you describe your daily physical activity?: Moderate  How would you describe your current health state?: Good  How do you maintain positive mental well-being?: Social Interaction  On a scale of 0 to 10, with 0 being no pain and 10 being severe pain, what is your pain level?: 0 - (None)  In the past six months, have you experienced urine leakage?: 0-No  At any time do you feel concerned for the safety/well-being of yourself and/or your children, in your home or elsewhere?: No  Have you had any immunizations at another office such as Influenza, Hepatitis B, Tetanus, or Pneumococcal?: Yes        Pranav Desai's SCREENING SCHEDULE   Tests on this list are recommended by your physician but may not be covered, or covered at this frequency, by your insurer.   Please check with your insurance carrier before scheduling to verify coverage.   PREVENTATIVE SERVICES FREQUENCY &  COVERAGE DETAILS LAST  COMPLETION DATE   Diabetes Screening    Fasting Blood Sugar / Glucose    One screening every 12 months if never tested or if previously tested but not diagnosed with pre-diabetes   One screening every 6 months if diagnosed with pre-diabetes Lab Results   Component Value Date    GLU 93 08/18/2023        Cardiovascular Disease Screening    Lipid Panel  Cholesterol  Lipoprotein (HDL)  Triglycerides Covered every 5 years for all Medicare beneficiaries without apparent signs or symptoms of cardiovascular disease Lab Results   Component Value Date    CHOLEST 158 08/18/2023    HDL 54 08/18/2023    LDL 81 08/18/2023    TRIG 131 08/18/2023         Electrocardiogram (EKG)   Covered if needed at Welcome to Medicare, and non-screening if indicated for medical reasons 08/19/2023      Ultrasound Screening for Abdominal Aortic Aneurysm (AAA) Covered once in a lifetime for one of the following risk factors    Men who are 65-75 years old and have ever smoked    Anyone with a family history -     Colorectal Cancer Screening  Covered for ages 50-85; only need ONE of the following:    Colonoscopy   Covered every 10 years    Covered every 2 years if patient is at high risk or previous colonoscopy was abnormal 12/08/2021    Health Maintenance   Topic Date Due    Colorectal Cancer Screening  12/08/2031       Flexible Sigmoidoscopy   Covered every 4 years -    Fecal Occult Blood Test Covered annually -   Prostate Cancer Screening    Prostate-Specific Antigen (PSA) Annually Lab Results   Component Value Date    PSA 4.08 (H) 08/18/2023     Health Maintenance   Topic Date Due    PSA  02/05/2026      Immunizations    Influenza Covered once per flu season  Please get every year -  No recommendations at this time    Pneumococcal Each vaccine (Spcurbe80 & [id number]) covered once after 65 Prevnar 13: -    [id number]: 01/10/2020     No recommendations at this time    Hepatitis B One screening covered for patients with certain risk factors    -  No recommendations at this time    Tetanus Toxoid Not covered by Medicare Part B unless medically necessary (cut with metal); may be covered with your pharmacy prescription benefits -    Tetanus, Diptheria and Pertusis TD and TDaP Not covered by Medicare Part B -  No recommendations at this time    Zoster Not covered by Medicare Part B; may be covered with your pharmacy  prescription benefits -  Zoster Vaccines(1 of 2) Never done

## 2024-03-07 RX ORDER — HYDROXYZINE HYDROCHLORIDE 10 MG/1
10 TABLET, FILM COATED ORAL 3 TIMES DAILY PRN
Qty: 90 TABLET | Refills: 0 | Status: SHIPPED | OUTPATIENT
Start: 2024-03-07

## 2024-03-07 NOTE — TELEPHONE ENCOUNTER
Patient last seen in Allergy 10/2/2023 for . . .    Pruritus  (primary encounter diagnosis)  Urticaria  Covid-19 vaccine series completed  Flu vaccine need    Requested Prescriptions   Pending Prescriptions Disp Refills    HYDROXYZINE 10 MG Oral Tab [Pharmacy Med Name: HYDROXYZINE HCL 10MG TABLETS] 90 tablet 0     Sig: TAKE 1 TABLET(10 MG) BY MOUTH THREE TIMES DAILY AS NEEDED FOR ITCHING       Antihistamines Passed - 3/6/2024 10:58 PM        Passed - Appt in past 12 mos or next 1 mos     Recent Outpatient Visits              1 week ago Medicare annual wellness visit, [facility]    [facility]    1 week ago Gastroesophageal reflux disease, unspecified whether esophagitis present    [facility]    [facility]    5 months ago Pruritus    [facility] Kendall Barclay MD    Office Visit    6 months ago Prostate cancer (HCC)    [facility]    [facility] Visit    6 months ago Medicare annual wellness visit, subsequent    [facility]    [facility]          Future Appointments         Provider Department Appt Notes    In 1 month ADO LUIS RM1; ADO DEXA RM1 [facility] KARRIE Yan     In 5 months IFTIKHAR SUAREZ [facility]    In 6 months Sharad Du MD [facility] Return in about 6 months (around 8/29/2024).                  HYDROXYZINE 10 MG Oral Tab 90 tablet 0 3/7/2024 --    Sig - Route: TAKE 1 TABLET(10 MG) BY MOUTH THREE TIMES DAILY AS NEEDED FOR ITCHING - Oral    Sent to pharmacy as: hydrOXYzine HCl 10 MG Oral Tablet (Atarax)    E-Prescribing Status: Receipt confirmed by pharmacy (3/7/2024   8:49 AM CST)      Pharmacy    [id number] - [place], [place] - [address] AT [facility], [phone], [phone]     Prescription as above sent to pharmacy per protocol.

## 2024-04-11 ENCOUNTER — HOSPITAL ENCOUNTER (OUTPATIENT)
Dept: BONE DENSITY | Age: 69
Discharge: HOME | End: 2024-04-11
Attending: FAMILY MEDICINE
Payer: MEDICARE

## 2024-04-11 DIAGNOSIS — M85.88 OSTEOPENIA OF LUMBAR SPINE: ICD-10-CM

## 2024-04-11 PROCEDURE — 77080 DXA BONE DENSITY AXIAL: CPT | Performed by: FAMILY MEDICINE

## 2024-08-13 ENCOUNTER — ANESTHESIA EVENT (OUTPATIENT)
Dept: ENDOSCOPY | Facility: HOSPITAL | Age: 69
End: 2024-08-13
Payer: MEDICARE

## 2024-08-13 ENCOUNTER — ANESTHESIA (OUTPATIENT)
Dept: ENDOSCOPY | Facility: HOSPITAL | Age: 69
End: 2024-08-13
Payer: MEDICARE

## 2024-08-13 ENCOUNTER — HOSPITAL ENCOUNTER (OUTPATIENT)
Facility: HOSPITAL | Age: 69
Setting detail: HOSPITAL OUTPATIENT SURGERY
Discharge: HOME | End: 2024-08-13
Attending: INTERNAL MEDICINE | Admitting: INTERNAL MEDICINE
Payer: MEDICARE

## 2024-08-13 VITALS
SYSTOLIC BLOOD PRESSURE: 128 MMHG | WEIGHT: 162 LBS | OXYGEN SATURATION: 98 % | HEART RATE: 49 BPM | RESPIRATION RATE: 16 BRPM | HEIGHT: 60 IN | BODY MASS INDEX: 31.8 KG/M2 | DIASTOLIC BLOOD PRESSURE: 77 MMHG

## 2024-08-13 DIAGNOSIS — Z12.11 COLON CANCER SCREENING: ICD-10-CM

## 2024-08-13 DIAGNOSIS — K21.9 GASTROESOPHAGEAL REFLUX DISEASE, UNSPECIFIED WHETHER ESOPHAGITIS PRESENT: ICD-10-CM

## 2024-08-13 DIAGNOSIS — R07.89 ATYPICAL CHEST PAIN: ICD-10-CM

## 2024-08-13 PROCEDURE — G0121 COLON CA SCRN NOT HI RSK IND: HCPCS | Performed by: INTERNAL MEDICINE

## 2024-08-13 PROCEDURE — 43239 EGD BIOPSY SINGLE/MULTIPLE: CPT | Performed by: INTERNAL MEDICINE

## 2024-08-13 PROCEDURE — 0DB68ZX EXCISION OF STOMACH, VIA NATURAL OR ARTIFICIAL OPENING ENDOSCOPIC, DIAGNOSTIC: ICD-10-PCS | Performed by: INTERNAL MEDICINE

## 2024-08-13 PROCEDURE — 0DJD8ZZ INSPECTION OF LOWER INTESTINAL TRACT, VIA NATURAL OR ARTIFICIAL OPENING ENDOSCOPIC: ICD-10-PCS | Performed by: INTERNAL MEDICINE

## 2024-08-13 RX ORDER — SODIUM CHLORIDE, SODIUM LACTATE, POTASSIUM CHLORIDE, CALCIUM CHLORIDE 600; 310; 30; 20 MG/100ML; MG/100ML; MG/100ML; MG/100ML
INJECTION, SOLUTION INTRAVENOUS CONTINUOUS
Status: DISCONTINUED | OUTPATIENT
Start: 2024-08-13 | End: 2024-08-13

## 2024-08-13 RX ORDER — ALBUTEROL SULFATE 90 UG/1
AEROSOL, METERED RESPIRATORY (INHALATION) EVERY 6 HOURS PRN
COMMUNITY

## 2024-08-13 RX ORDER — NALOXONE HYDROCHLORIDE 0.4 MG/ML
0.08 INJECTION, SOLUTION INTRAMUSCULAR; INTRAVENOUS; SUBCUTANEOUS ONCE AS NEEDED
Status: DISCONTINUED | OUTPATIENT
Start: 2024-08-13 | End: 2024-08-13

## 2024-08-13 NOTE — ANESTHESIA PREPROCEDURE EVALUATION
Anesthesia PreOp Note    HPI:     Pranav Desai is a 68 year old male who presents for preoperative consultation requested by: Timothy Lara MD    Date of Surgery: 8/13/2024    Procedure(s):  COLONOSCOPY/ ESOPHAGOGASTRODUODENOSCOPY  ESOPHAGOGASTRODUODENOSCOPY (EGD)  Indication: Gastroesophageal reflux disease, unspecified whether esophagitis present / Colon cancer screening/ Atypical chest pain    Relevant Problems   No relevant active problems       NPO:  Last Liquid Consumption Date: 08/13/24  Last Liquid Consumption Time: 1000  Last Solid Consumption Date: 08/12/24  Last Solid Consumption Time: 1700  Last Liquid Consumption Date: 08/13/24          History Review:  There are no problems to display for this patient.      Past Medical History:    Cancer (HCC)    Prostate cancer 2013        Past Surgical History:   Procedure Laterality Date    Cholecystectomy  2012    Colonoscopy  10/2013    2018 /[place] [facility]    Hemorrhoidectomy      1991    Laparoscopic cholecystectomy  2012       Medications Prior to Admission   Medication Sig Dispense Refill Last Dose    albuterol 108 (90 Base) MCG/ACT Inhalation Aero Soln Inhale into the lungs every 6 (six) hours as needed for Wheezing.   8/6/2024 at prn    HYDROXYZINE 10 MG Oral Tab TAKE 1 TABLET(10 MG) BY MOUTH THREE TIMES DAILY AS NEEDED FOR ITCHING 90 tablet 0 8/11/2024    ergocalciferol 1.25 MG (29786 UT) Oral Cap Take 1 capsule (50,000 Units total) by mouth once a week.       levocetirizine (XYZAL) 5 MG Oral Tab Take 1 tablet (5 mg total) by mouth daily. 90 tablet 2 8/11/2024    triamcinolone 0.1 % External Ointment Applied 2 times per day as needed 60 g 0     zolpidem 10 MG Oral Tab Take 1 tablet (10 mg total) by mouth nightly. 90 tablet 1 8/12/2024    fluticasone propionate 50 MCG/ACT Nasal Suspension 2 sprays by Nasal route daily. 48 g 2     Omeprazole 40 MG Oral Capsule Delayed Release Take 1 capsule (40 mg total) by mouth daily. 90 capsule 1 8/10/2024     hydrOXYzine 25 MG Oral Tab Take 1 tablet (25 mg total) by mouth nightly as needed for Itching. (Patient not taking: Reported on 8/18/2023) 90 tablet 1 8/11/2024    finasteride 5 MG Oral Tab Take 1 tablet (5 mg total) by mouth daily.   8/11/2024    tamsulosin (FLOMAX) cap Take 1 capsule (0.4 mg total) by mouth daily.   8/11/2024     Current Facility-Administered Medications Ordered in Epic   Medication Dose Route Frequency Provider Last Rate Last Admin    lactated ringers infusion   Intravenous Continuous Timothy Lara MD         No current [facility]-ordered outpatient medications on file.       No Known Allergies    Family History   Problem Relation Age of Onset    Cancer Father         Prostate cancer    Cancer Brother         Prosate cancer     Social History     Socioeconomic History    Marital status:    Tobacco Use    Smoking status: Never     Passive exposure: Never    Smokeless tobacco: Never   Vaping Use    Vaping status: Never Used   Substance and Sexual Activity    Alcohol use: Not Currently    Drug use: Never       Available pre-op labs reviewed.             Vital Signs:  Body mass index is 31.64 kg/m².   height is 1.524 m (5') and weight is 73.5 kg (162 lb).   Vitals:    08/13/24 1305   Weight: 73.5 kg (162 lb)   Height: 1.524 m (5')        Anesthesia Evaluation     Patient summary reviewed and Nursing notes reviewed    Airway   Mallampati: III  Dental      Pulmonary - normal exam   Cardiovascular - normal exam    Rhythm: regular  Rate: normal    Neuro/Psych      GI/Hepatic/Renal    (+) bowel prep    Endo/Other      Comments: Prostate Cancer  Abdominal  - normal exam     Other findings: Poor dentition            Anesthesia Plan:   ASA:  2  Plan:   MAC  Informed Consent Plan and Risks Discussed With:  Patient  Discussed plan with:  CRNA and surgeon      I have informed Pranav Desai and/or legal guardian or family member of the nature of the anesthetic plan, benefits, risks including  possible dental damage if relevant, major complications, and any alternative forms of anesthetic management.   All of the patient's questions were answered to the best of my ability. The patient desires the anesthetic management as planned.  Indra Wilson CRNA  8/13/2024 1:12 PM  Present on Admission:  **None**

## 2024-08-13 NOTE — OPERATIVE REPORT
[facility]    EGD AND COLONOSCOPY PROCEDURE REPORTS:    DATE OF PROCEDURE:  8/13/2024    PCP: XUAN SANDOVAL MD     PREOPERATIVE DIAGNOSIS:  GERD symptoms, atypical chest pain, screening colonoscopy examination     POSTOPERATIVE DIAGNOSIS:  See impression.     SURGEON:  Timothy Lara M.D.     SEDATION:    MAC anesthesia provided by the Anesthesia Service.  MAC anesthesia requested due to age 68, anticipated intolerance of the EGD and colonoscopy examination under safe doses conscious sedation medications    Estimated blood loss: none/insignificant       EGD PROCEDURE:    After the nature and risks of EGD and colonoscopy examinations under MAC anesthesia were discussed with the patient and all questions answered, informed consent was obtained.  The patient was sedated as above.      Once sedated, the Olympus adult diagnostic gastroscope was placed in the patient's mouth and advanced under direct visualization through the oropharynx into the esophagus, on down through the stomach and pylorus to the duodenal bulb and second portions of the duodenum.  Retroflexion was performed in the stomach.     EGD FINDINGS:    Esophagus and GE junction: Healthy and normal esophagus down to a normal GE junction and Z-line.  No abnormal mucosa.    No hiatal hernia on forward or retroflexed views.    Stomach: Clear secretions present.  Mild chronic inflammatory changes diffusely gastric mucosa with loss of normal vascular pattern, diffuse mild erythematous appearance.  Random gastric mucosal biopsies obtained.    Duodenum: Clear secretions present. Normal duodenal bulb and second portion duodenum.    COLONOSCOPY PROCEDURE:    The patient was repositioned for colonoscopy examination.  Additional sedation given.  Digital rectal exam was performed, which showed no masses.  The Olympus pediatric video colonoscope was advanced under direct visualization through the entire length of the colon to the cecum.   Retroflex exam performed up the ascending colon to the hepatic flexure.  Cecum was confirmed by landmarks, including appendiceal orifice, cecal trifold, ileocecal valve.  Retroflexion was performed in the rectum.    The quality of the prep was excellent.     COLONOSCOPY FINDINGS:  Rare diverticuli sigmoid colon.  Small internal hemorrhoids.     IMPRESSION:  Normal esophagus and GE junction in this patient with history of GERD symptoms, atypical chest pain.  Mild chronic nonerosive gastritis on EGD examination; random gastric mucosal biopsies obtained.  Rare diverticuli sigmoid colon.  Small internal hemorrhoids.     RECOMMENDATIONS:  Followup above gastric mucosal biopsy results.  High-fiber diet.  Repeat colonoscopy examination or other colon cancer screening in 8-10 years, or as per family history moving forward.

## 2024-08-13 NOTE — DISCHARGE INSTRUCTIONS
Home Care Instructions for Colonoscopy and/or Gastroscopy with Sedation    Diet:  - Resume your regular diet as tolerated unless otherwise instructed.  - Start with light meals to minimize bloating.  - Do not drink alcohol today.    Medication:  - If you have questions about resuming your normal medications, please contact your Primary Care Physician.    Activities:  - Take it easy today. Do not return to work today.  - Do not drive today.  - Do not operate any machinery today (including kitchen equipment).    Colonoscopy:  - You may notice some rectal \"spotting\" (a little blood on the toilet tissue) for a day or two after the exam. This is normal.  - If you experience any rectal bleeding (not spotting), persistent tenderness or sharp severe abdominal pains, oral temperature over 100 degrees Fahrenheit, light-headedness or dizziness, or any other problems, contact your doctor.    Gastroscopy:  - You may have a sore throat for 2-3 days following the exam. This is normal. Gargling with warm salt water (1/2 tsp salt to 1 glass warm water) or using throat lozenges will help.  - If you experience any sharp pain in your neck, abdomen or chest, vomiting of blood, oral temperature over 100 degrees Fahrenheit, light-headedness or dizziness, or any other problems, contact your doctor.    **If unable to reach your doctor, please go to the [facility] Emergency Room**    - Your referring physician will receive a full report of your examination.  - If you do not hear from your doctor's office within two weeks of your biopsy, please call them for your results.    You may be able to see your laboratory results in [facility] between 4 and 7 business days.  In some cases, your physician may not have viewed the results before they are released to [facility].  If you have questions regarding your results contact the physician who ordered the test/exam by phone or via [facility] by choosing \"Ask a Medical Question.\"    .  .  .  Notes from  Dr. Lara:    Healthy stomach endoscopy examination today.  Your esophagus was healthy, nothing wrong even with those heartburn symptoms.  Today I saw mild gastritis inflammation in your stomach.  I took some biopsies of the lining of your stomach to look for infection or other causes.  Very small internal hemorrhoids only on today's colonoscopy exam.  Those may cause occasional blood streaks but are not a problem.  NO colon polyps today.  Great news.  If you are raw and irritated back there after all of that diarrhea and wiping, three good options to soothe and heal the irritation include Aquaphor ointment, \"Desitin\" or \"A & D\" diaper ointment, or good old-fashioned Vaseline.  All of these soothe and create a protective barrier so that your skin can heal.  Repeat colonoscopy exam or other colon cancer screening in 8-10 years, unless siblings or parents found to have colon polyps or colon cancer.  .  .  .

## 2024-08-13 NOTE — ANESTHESIA POSTPROCEDURE EVALUATION
Patient: Pranav Desai    Procedure Summary       Date: 08/13/24 Room / Location: [facility] ENDOSCOPY 01 / [facility] ENDOSCOPY    Anesthesia Start: 1505 Anesthesia Stop: 1553    Procedures:       COLONOSCOPY/ ESOPHAGOGASTRODUODENOSCOPY      ESOPHAGOGASTRODUODENOSCOPY (EGD) Diagnosis:       Gastroesophageal reflux disease, unspecified whether esophagitis present      Colon cancer screening      Atypical chest pain      (gastritis, hemorrhoids)    Surgeons: Timothy Lara MD Anesthesiologist:     Anesthesia Type: MAC ASA Status: 2            Anesthesia Type: No value filed.    Vitals Value Taken Time   /76 08/13/24 1553   Temp 97.9 08/13/24 1553   Pulse 53 08/13/24 1552   Resp 11 08/13/24 1553   SpO2 100 % 08/13/24 1552   Vitals shown include unfiled device data.    [facility] AN Post Evaluation:   Patient Evaluated in PACU  Patient Participation: complete - patient participated  Level of Consciousness: awake and alert  Pain Score: 2  Pain Management: adequate  Airway Patency:patent  Yes    Nausea/Vomiting: none  Cardiovascular Status: acceptable  Respiratory Status: acceptable  Postoperative Hydration acceptable      Jayson Case MD, PhD  8/13/2024 3:53 PM

## 2024-08-13 NOTE — H&P
History & Physical Examination    Patient Name: Pranav Desai  MRN: [id number]  [facility]: [id number]  YOB: 1955    Diagnosis: GERD symptoms, atypical chest pain, screening colonoscopy examination    PRESENT ILLNESS: 68-year-old gentleman with BMI 31.6, distant history prostate cancer who presents for EGD and colonoscopy examinations for evaluation of GERD burning heartburn symptoms and screening colonoscopy examination.      BMI Readings from Last 1 Encounters:   08/13/24 31.64 kg/m²         Medications Prior to Admission   Medication Sig Dispense Refill Last Dose    albuterol 108 (90 Base) MCG/ACT Inhalation Aero Soln Inhale into the lungs every 6 (six) hours as needed for Wheezing.   8/6/2024 at prn    HYDROXYZINE 10 MG Oral Tab TAKE 1 TABLET(10 MG) BY MOUTH THREE TIMES DAILY AS NEEDED FOR ITCHING 90 tablet 0 8/11/2024    ergocalciferol 1.25 MG (85004 UT) Oral Cap Take 1 capsule (50,000 Units total) by mouth once a week.       levocetirizine (XYZAL) 5 MG Oral Tab Take 1 tablet (5 mg total) by mouth daily. 90 tablet 2 8/11/2024    triamcinolone 0.1 % External Ointment Applied 2 times per day as needed 60 g 0     zolpidem 10 MG Oral Tab Take 1 tablet (10 mg total) by mouth nightly. 90 tablet 1 8/12/2024    fluticasone propionate 50 MCG/ACT Nasal Suspension 2 sprays by Nasal route daily. 48 g 2     Omeprazole 40 MG Oral Capsule Delayed Release Take 1 capsule (40 mg total) by mouth daily. 90 capsule 1 8/10/2024    hydrOXYzine 25 MG Oral Tab Take 1 tablet (25 mg total) by mouth nightly as needed for Itching. (Patient not taking: Reported on 8/18/2023) 90 tablet 1 8/11/2024    finasteride 5 MG Oral Tab Take 1 tablet (5 mg total) by mouth daily.   8/11/2024    tamsulosin (FLOMAX) cap Take 1 capsule (0.4 mg total) by mouth daily.   8/11/2024     Current Facility-Administered Medications   Medication Dose Route Frequency    lactated ringers infusion   Intravenous Continuous       Allergies: No Known  Allergies    Past Medical History:    Cancer (HCC)    Prostate cancer 2013      Past Surgical History:   Procedure Laterality Date    Cholecystectomy  2012    Colonoscopy  10/2013    2018 /[facility]    Hemorrhoidectomy      1991    Laparoscopic cholecystectomy  2012     Family History   Problem Relation Age of Onset    Cancer Father         Prostate cancer    Cancer Brother         Prosate cancer     Social History     Tobacco Use    Smoking status: Never     Passive exposure: Never    Smokeless tobacco: Never   Substance Use Topics    Alcohol use: Not Currently       SYSTEM Check if Review is Normal Check if Physical Exam is Normal If not normal, please explain:   HEENT [ ] [X ]    NECK & BACK [ ] [ ]    HEART [ X ] [ X ]    LUNGS [ X ] [ X ]    ABDOMEN [ X ] [ X ]    UROGENITAL [ ] [ ]    EXTREMITIES [ ] [ ]    OTHER        See Anesthesia documentation    [ x ] I have discussed the risks and benefits and alternatives with the patient/family.  They understand and agree to proceed with plan of care.  [ x ] I have reviewed the History and Physical done within the last 30 days.  Any changes noted above.    Timothy Lara MD  8/13/2024  2:59 PM

## 2024-08-15 ENCOUNTER — LAB ENCOUNTER (OUTPATIENT)
Dept: LAB | Age: 69
End: 2024-08-15
Attending: UROLOGY
Payer: MEDICARE

## 2024-08-15 DIAGNOSIS — C61 PROSTATE CANCER (HCC): Primary | ICD-10-CM

## 2024-08-15 DIAGNOSIS — N40.1 BPH WITH OBSTRUCTION/LOWER URINARY TRACT SYMPTOMS: ICD-10-CM

## 2024-08-15 DIAGNOSIS — N13.8 BPH WITH OBSTRUCTION/LOWER URINARY TRACT SYMPTOMS: ICD-10-CM

## 2024-08-15 DIAGNOSIS — N40.0 BPH WITHOUT OBSTRUCTION/LOWER URINARY TRACT SYMPTOMS: ICD-10-CM

## 2024-08-15 LAB — PSA SERPL-MCNC: 2.19 NG/ML (ref ?–4)

## 2024-08-15 PROCEDURE — 36415 COLL VENOUS BLD VENIPUNCTURE: CPT

## 2024-08-15 PROCEDURE — 84153 ASSAY OF PSA TOTAL: CPT

## 2024-09-03 ENCOUNTER — OFFICE VISIT (OUTPATIENT)
Dept: FAMILY MEDICINE CLINIC | Facility: CLINIC | Age: 69
End: 2024-09-03

## 2024-09-03 VITALS
HEIGHT: 63 IN | WEIGHT: 163.38 LBS | BODY MASS INDEX: 28.95 KG/M2 | RESPIRATION RATE: 18 BRPM | SYSTOLIC BLOOD PRESSURE: 127 MMHG | DIASTOLIC BLOOD PRESSURE: 74 MMHG | TEMPERATURE: 98 F | HEART RATE: 56 BPM

## 2024-09-03 DIAGNOSIS — A04.8 H. PYLORI INFECTION: Primary | ICD-10-CM

## 2024-09-03 DIAGNOSIS — K64.4 EXTERNAL HEMORRHOIDS: ICD-10-CM

## 2024-09-03 PROCEDURE — 3008F BODY MASS INDEX DOCD: CPT | Performed by: FAMILY MEDICINE

## 2024-09-03 PROCEDURE — 3074F SYST BP LT 130 MM HG: CPT | Performed by: FAMILY MEDICINE

## 2024-09-03 PROCEDURE — 99214 OFFICE O/P EST MOD 30 MIN: CPT | Performed by: FAMILY MEDICINE

## 2024-09-03 PROCEDURE — 1159F MED LIST DOCD IN RCRD: CPT | Performed by: FAMILY MEDICINE

## 2024-09-03 PROCEDURE — 1126F AMNT PAIN NOTED NONE PRSNT: CPT | Performed by: FAMILY MEDICINE

## 2024-09-03 PROCEDURE — 3078F DIAST BP <80 MM HG: CPT | Performed by: FAMILY MEDICINE

## 2024-09-03 RX ORDER — ESOMEPRAZOLE MAGNESIUM 40 MG/1
40 CAPSULE, DELAYED RELEASE ORAL 2 TIMES DAILY
Qty: 28 CAPSULE | Refills: 0 | Status: SHIPPED | OUTPATIENT
Start: 2024-09-03 | End: 2024-09-17

## 2024-09-03 RX ORDER — CLARITHROMYCIN 500 MG
500 TABLET ORAL 2 TIMES DAILY
Qty: 28 TABLET | Refills: 0 | Status: SHIPPED | OUTPATIENT
Start: 2024-09-03 | End: 2024-09-17

## 2024-09-03 RX ORDER — AMOXICILLIN 500 MG/1
1000 CAPSULE ORAL 2 TIMES DAILY
Qty: 56 CAPSULE | Refills: 0 | Status: SHIPPED | OUTPATIENT
Start: 2024-09-03 | End: 2024-09-17

## 2024-09-03 RX ORDER — HYDROCORTISONE 25 MG/G
1 CREAM TOPICAL 2 TIMES DAILY
Qty: 28 G | Refills: 1 | Status: SHIPPED | OUTPATIENT
Start: 2024-09-03

## 2024-09-03 NOTE — PROGRESS NOTES
9/3/2024  10:24 AM    Pranav Desai is a 68 year old male.    Chief complaint(s):   Chief Complaint   Patient presents with    Follow - Up     HPI:     Pranav Desai primary complaint is regarding + H pylori.     Patient is a 68-year-old male who is following up after recent colonoscopy and EGD.  EGD was positive for H. pylori.  Also have some external hemorrhoids which are bothering him and complaining of itching and burning sensation.  Denies any hematochezia.  Also has been taking some omeprazole which has improved his symptoms but not resolved them.      HISTORY:  Past Medical History:    Cancer (HCC)    Prostate cancer 2013       Past Surgical History:   Procedure Laterality Date    Cholecystectomy  2012    Colonoscopy  10/2013    2018 /[facility]    Colonoscopy N/A 8/13/2024    Procedure: COLONOSCOPY;  Surgeon: Timothy Lara MD;  Location: [facility] ENDOSCOPY    Hemorrhoidectomy      1991    Laparoscopic cholecystectomy  2012      Family History   Problem Relation Age of Onset    Cancer Father         Prostate cancer    Cancer Brother         Prosate cancer      Social History:   Social History     Socioeconomic History    Marital status:    Tobacco Use    Smoking status: Never     Passive exposure: Never    Smokeless tobacco: Never   Vaping Use    Vaping status: Never Used   Substance and Sexual Activity    Alcohol use: Not Currently    Drug use: Never        Immunizations:   Immunization History   Administered Date(s) Administered    Covid-19 Vaccine Pfizer 30 mcg/0.3 ml 02/09/2021, 03/02/2021    FLU VAC High Dose 65 YRS & Older PRSV Free (84116) 11/25/2020, 12/08/2021, 10/11/2022    FLUZONE 6 months and older PFS 0.5 ml (78948) 10/11/2016, 11/01/2017    FLUZONE 6-35 Mos 0.25 ml Dose Quad Split PF (12364) 10/07/2015    Fluvirin, 3 Years & >, Im 11/11/2013    Fluzone Vaccine Medicare () 01/10/2020    High Dose Fluzone Influenza Vaccine, 65yr+ PF 0.5mL (41035) 10/07/2023    Influenza  10/07/2015    Pneumococcal Conjugate PCV20 10/11/2022    Pneumovax 23 01/10/2020    TDAP 11/08/2023       Medications (Active prior to today's visit):  Current Outpatient Medications   Medication Sig Dispense Refill    amoxicillin 500 MG Oral Cap Take 2 capsules (1,000 mg total) by mouth 2 (two) times daily for 14 days. 56 capsule 0    clarithromycin 500 MG Oral Tab Take 1 tablet (500 mg total) by mouth 2 (two) times daily for 14 days. 28 tablet 0    Esomeprazole Magnesium (NEXIUM) 40 MG Oral Capsule Delayed Release Take 1 capsule (40 mg total) by mouth in the morning and 1 capsule (40 mg total) before bedtime. Do all this for 14 days. 28 capsule 0    hydrocortisone (ANUSOL-HC) 2.5 % External Cream Place 1 Application rectally 2 (two) times daily. 28 g 1    albuterol 108 (90 Base) MCG/ACT Inhalation Aero Soln Inhale into the lungs every 6 (six) hours as needed for Wheezing.      ergocalciferol 1.25 MG (57418 UT) Oral Cap Take 1 capsule (50,000 Units total) by mouth once a week.      levocetirizine (XYZAL) 5 MG Oral Tab Take 1 tablet (5 mg total) by mouth daily. 90 tablet 2    triamcinolone 0.1 % External Ointment Applied 2 times per day as needed 60 g 0    zolpidem 10 MG Oral Tab Take 1 tablet (10 mg total) by mouth nightly. 90 tablet 1    fluticasone propionate 50 MCG/ACT Nasal Suspension 2 sprays by Nasal route daily. 48 g 2    Omeprazole 40 MG Oral Capsule Delayed Release Take 1 capsule (40 mg total) by mouth daily. 90 capsule 1    finasteride 5 MG Oral Tab Take 1 tablet (5 mg total) by mouth daily.      tamsulosin (FLOMAX) cap Take 1 capsule (0.4 mg total) by mouth daily.      HYDROXYZINE 10 MG Oral Tab TAKE 1 TABLET(10 MG) BY MOUTH THREE TIMES DAILY AS NEEDED FOR ITCHING (Patient not taking: Reported on 9/3/2024) 90 tablet 0    hydrOXYzine 25 MG Oral Tab Take 1 tablet (25 mg total) by mouth nightly as needed for Itching. (Patient not taking: Reported on 8/18/2023) 90 tablet 1       Allergies:  No Known  Allergies      ROS:   Review of Systems   Constitutional:  Negative for appetite change, fatigue and fever.   Respiratory:  Negative for shortness of breath.    Cardiovascular:  Negative for chest pain.   Gastrointestinal:  Positive for rectal pain (hemorrhoids). Negative for abdominal pain, anal bleeding, blood in stool, constipation, diarrhea, nausea and vomiting.        Heart burn   Musculoskeletal:  Negative for myalgias.   Skin:  Negative for rash.   Neurological:  Negative for dizziness, weakness and headaches.       PHYSICAL EXAM:   VS: /74   Pulse 56   Temp 97.7 °F (36.5 °C) (Tympanic)   Resp 18   Ht 5' 3\" (1.6 m)   Wt 163 lb 6.4 oz (74.1 kg)   BMI 28.95 kg/m²     Physical Exam  Vitals reviewed.   Constitutional:       Appearance: Normal appearance. He is well-developed.   HENT:      Head: Normocephalic.   Eyes:      General: No scleral icterus.     Conjunctiva/sclera: Conjunctivae normal.   Cardiovascular:      Rate and Rhythm: Normal rate.   Pulmonary:      Effort: Pulmonary effort is normal.   Musculoskeletal:      Cervical back: Neck supple.   Skin:     Findings: No rash.   Psychiatric:         Mood and Affect: Mood normal.         LABORATORY RESULTS:     EKG / Spirometry : -     Radiology: No results found.     ASSESSMENT/PLAN:   Assessment   Encounter Diagnoses   Name Primary?    H. pylori infection Yes    External hemorrhoids             MEDICATIONS:   Requested Prescriptions     Signed Prescriptions Disp Refills    amoxicillin 500 MG Oral Cap 56 capsule 0     Sig: Take 2 capsules (1,000 mg total) by mouth 2 (two) times daily for 14 days.    clarithromycin 500 MG Oral Tab 28 tablet 0     Sig: Take 1 tablet (500 mg total) by mouth 2 (two) times daily for 14 days.    Esomeprazole Magnesium (NEXIUM) 40 MG Oral Capsule Delayed Release 28 capsule 0     Sig: Take 1 capsule (40 mg total) by mouth in the morning and 1 capsule (40 mg total) before bedtime. Do all this for 14 days.    hydrocortisone  (ANUSOL-HC) 2.5 % External Cream 28 g 1     Sig: Place 1 Application rectally 2 (two) times daily.     RECOMMENDATIONS given include: PUD Diet Instructions: Avoid any citric juice; lemonade, orange juice, grapefruit juice, pineapple juice, caffeine; coffee, tea, soda, chocolate. Also avoid spicy foods, and alcohol. In cale avoid over use of NSAID's  FOLLOW-UP: Instructed to call if new or worsening symptoms develop. Schedule follow-up appointments in 4 months.        Orders This Visit:  No orders of the defined types were placed in this encounter.      Meds This Visit:  Requested Prescriptions     Signed Prescriptions Disp Refills    amoxicillin 500 MG Oral Cap 56 capsule 0     Sig: Take 2 capsules (1,000 mg total) by mouth 2 (two) times daily for 14 days.    clarithromycin 500 MG Oral Tab 28 tablet 0     Sig: Take 1 tablet (500 mg total) by mouth 2 (two) times daily for 14 days.    Esomeprazole Magnesium (NEXIUM) 40 MG Oral Capsule Delayed Release 28 capsule 0     Sig: Take 1 capsule (40 mg total) by mouth in the morning and 1 capsule (40 mg total) before bedtime. Do all this for 14 days.    hydrocortisone (ANUSOL-HC) 2.5 % External Cream 28 g 1     Sig: Place 1 Application rectally 2 (two) times daily.       Imaging & Referrals:  None         XUAN SANDOVAL MD

## 2024-09-21 ENCOUNTER — TELEPHONE (OUTPATIENT)
Facility: CLINIC | Age: 69
End: 2024-09-21

## 2024-09-21 DIAGNOSIS — Z86.19 HISTORY OF HELICOBACTER PYLORI INFECTION: Primary | ICD-10-CM

## 2024-09-21 NOTE — TELEPHONE ENCOUNTER
GI RNs,    Could you please call Mr. Desai to check on him?  Gastric biopsies of 8/13/2024 showed H. pylori.  Dr. Sharad Du was kind enough to prescribe him the amoxicillin/clarithromycin treatment for H. pylori during a recent office visit.    If he filled and took the antibiotics, please recall for H. pylori breath test in 12 weeks.    - cb

## 2024-09-22 ENCOUNTER — HOSPITAL ENCOUNTER (OUTPATIENT)
Age: 69
Discharge: HOME | End: 2024-09-22
Payer: MEDICARE

## 2024-09-22 ENCOUNTER — APPOINTMENT (OUTPATIENT)
Dept: GENERAL RADIOLOGY | Age: 69
End: 2024-09-22
Attending: NURSE PRACTITIONER
Payer: MEDICARE

## 2024-09-22 VITALS
RESPIRATION RATE: 20 BRPM | TEMPERATURE: 98 F | HEART RATE: 70 BPM | OXYGEN SATURATION: 96 % | DIASTOLIC BLOOD PRESSURE: 72 MMHG | SYSTOLIC BLOOD PRESSURE: 123 MMHG

## 2024-09-22 DIAGNOSIS — M43.6 TORTICOLLIS: Primary | ICD-10-CM

## 2024-09-22 LAB
#MXD IC: 0.9 X10ˆ3/UL (ref 0.1–1)
ATRIAL RATE: 66 BPM
BUN BLD-MCNC: 15 MG/DL (ref 7–18)
CHLORIDE BLD-SCNC: 103 MMOL/L (ref 98–112)
CO2 BLD-SCNC: 23 MMOL/L (ref 21–32)
CREAT BLD-MCNC: 0.8 MG/DL
EGFRCR SERPLBLD CKD-EPI 2021: 95 ML/MIN/1.73M2 (ref 60–?)
GLUCOSE BLD-MCNC: 112 MG/DL (ref 70–99)
HCT VFR BLD AUTO: 39 %
HCT VFR BLD CALC: 41 %
HGB BLD-MCNC: 13.2 G/DL
ISTAT IONIZED CALCIUM FOR CHEM 8: 1.19 MMOL/L (ref 1.12–1.32)
LYMPHOCYTES # BLD AUTO: 1.6 X10ˆ3/UL (ref 1–4)
LYMPHOCYTES NFR BLD AUTO: 23.9 %
MCH RBC QN AUTO: 29.7 PG (ref 26–34)
MCHC RBC AUTO-ENTMCNC: 33.8 G/DL (ref 31–37)
MCV RBC AUTO: 87.8 FL (ref 80–100)
MIXED CELL %: 13.1 %
NEUTROPHILS # BLD AUTO: 4 X10ˆ3/UL (ref 1.5–7.7)
NEUTROPHILS NFR BLD AUTO: 63 %
P AXIS: 52 DEGREES
P-R INTERVAL: 166 MS
PLATELET # BLD AUTO: 162 X10ˆ3/UL (ref 150–450)
POTASSIUM BLD-SCNC: 4 MMOL/L (ref 3.6–5.1)
Q-T INTERVAL: 384 MS
QRS DURATION: 82 MS
QTC CALCULATION (BEZET): 402 MS
R AXIS: 0 DEGREES
RBC # BLD AUTO: 4.44 X10ˆ6/UL
SODIUM BLD-SCNC: 137 MMOL/L (ref 136–145)
T AXIS: 31 DEGREES
TROPONIN I BLD-MCNC: <0.02 NG/ML
VENTRICULAR RATE: 66 BPM
WBC # BLD AUTO: 6.5 X10ˆ3/UL (ref 4–11)

## 2024-09-22 PROCEDURE — 80047 BASIC METABLC PNL IONIZED CA: CPT | Performed by: NURSE PRACTITIONER

## 2024-09-22 PROCEDURE — 71046 X-RAY EXAM CHEST 2 VIEWS: CPT | Performed by: NURSE PRACTITIONER

## 2024-09-22 PROCEDURE — 85025 COMPLETE CBC W/AUTO DIFF WBC: CPT | Performed by: NURSE PRACTITIONER

## 2024-09-22 PROCEDURE — 93000 ELECTROCARDIOGRAM COMPLETE: CPT | Performed by: NURSE PRACTITIONER

## 2024-09-22 PROCEDURE — 96372 THER/PROPH/DIAG INJ SC/IM: CPT | Performed by: NURSE PRACTITIONER

## 2024-09-22 PROCEDURE — 99214 OFFICE O/P EST MOD 30 MIN: CPT | Performed by: NURSE PRACTITIONER

## 2024-09-22 PROCEDURE — 84484 ASSAY OF TROPONIN QUANT: CPT | Performed by: NURSE PRACTITIONER

## 2024-09-22 RX ORDER — CYCLOBENZAPRINE HCL 10 MG
10 TABLET ORAL 3 TIMES DAILY PRN
Qty: 20 TABLET | Refills: 0 | Status: SHIPPED | OUTPATIENT
Start: 2024-09-22 | End: 2024-09-29

## 2024-09-22 RX ORDER — METHYLPREDNISOLONE 4 MG
TABLET, DOSE PACK ORAL
Qty: 1 EACH | Refills: 0 | Status: SHIPPED | OUTPATIENT
Start: 2024-09-22

## 2024-09-22 RX ORDER — KETOROLAC TROMETHAMINE 30 MG/ML
30 INJECTION, SOLUTION INTRAMUSCULAR; INTRAVENOUS ONCE
Status: COMPLETED | OUTPATIENT
Start: 2024-09-22 | End: 2024-09-22

## 2024-09-22 NOTE — ED PROVIDER NOTES
He    Patient Seen in: [facility]      History     Chief Complaint   Patient presents with    Pain     Stated Complaint: left shoulder and neck pain  Subjective:   68-year-old male with a history of prostate cancer presents for left-sided neck pain that started yesterday evening.  He states he was out in the heat and then came in in the air conditioning and that is when it started.  He states he is having a lot of pain when he is moving his neck from the right to left.  No swelling.  The pain does radiate into the left shoulder.  He has mild discomfort when putting his left arm over his head.  No history of fall or trauma.  No numbness or tingling.  No weakness.  No fevers.  No chest pain or difficulty breathing.  No headaches or dizziness.  No rashes or skin abnormalities.  He did take ibuprofen with some relief.  He appears nontoxic.      Objective:   Past Medical History:    Cancer (HCC)    Prostate cancer 2013             Past Surgical History:   Procedure Laterality Date    Cholecystectomy  2012    Colonoscopy  10/2013    2018 /[facility]    Colonoscopy N/A 8/13/2024    Procedure: COLONOSCOPY;  Surgeon: Timothy Lara MD;  Location: [facility] ENDOSCOPY    Hemorrhoidectomy      1991    Laparoscopic cholecystectomy  2012              Social History     Socioeconomic History    Marital status:    Tobacco Use    Smoking status: Never     Passive exposure: Never    Smokeless tobacco: Never   Vaping Use    Vaping status: Never Used   Substance and Sexual Activity    Alcohol use: Not Currently    Drug use: Never            Review of Systems    Positive for stated complaint: Pain     Other systems are as noted in HPI.  Constitutional and vital signs reviewed.      All other systems reviewed and negative except as noted above.    Physical Exam     ED Triage Vitals [09/22/24 1323]   /72   Pulse 70   Resp 20   Temp 97.6 °F (36.4 °C)   Temp src Temporal   SpO2 96 %   O2 Device None (Room  air)     Current:/72   Pulse 70   Temp 97.6 °F (36.4 °C) (Temporal)   Resp 20   SpO2 96%     Physical Exam  Vitals and nursing note reviewed.   Constitutional:       General: He is not in acute distress.     Appearance: Normal appearance. He is not toxic-appearing.   HENT:      Head: Normocephalic.      Right Ear: Tympanic membrane normal.      Left Ear: Tympanic membrane normal.      Nose: Nose normal.      Mouth/Throat:      Mouth: Mucous membranes are moist.   Eyes:      Conjunctiva/sclera: Conjunctivae normal.      Pupils: Pupils are equal, round, and reactive to light.   Neck:      Vascular: No carotid bruit.   Cardiovascular:      Rate and Rhythm: Normal rate and regular rhythm.   Pulmonary:      Effort: Pulmonary effort is normal.      Breath sounds: Rhonchi present. No wheezing or rales.   Musculoskeletal:         General: Normal range of motion.      Cervical back: Tenderness present.   Lymphadenopathy:      Cervical: No cervical adenopathy.   Skin:     General: Skin is warm and dry.      Capillary Refill: Capillary refill takes less than 2 seconds.      Findings: No bruising, erythema or rash.   Neurological:      General: No focal deficit present.      Mental Status: He is alert and oriented to person, place, and time.      Cranial Nerves: No cranial nerve deficit.      Sensory: No sensory deficit.      Motor: No weakness.      Coordination: Coordination normal.   Psychiatric:         Mood and Affect: Mood normal.         Behavior: Behavior normal.         ED Course   XR CHEST PA + LAT CHEST (CPT=71046)    Result Date: 9/22/2024  CONCLUSION:   No new focal opacity, pleural effusion, or pneumothorax.  No significant change in the mild bibasilar atelectasis/scarring.  Lesser incidental findings described above.     Dictated by (CST): Horacio Dee MD on 9/22/2024 at 2:29 PM     Finalized by (CST): Horacio Dee MD on 9/22/2024 at 2:30 PM         Labs Reviewed   POCT ISTAT CHEM8 CARTRIDGE -  Abnormal; Notable for the following components:       Result Value    ISTAT Glucose 112 (*)     All other components within normal limits   ISTAT TROPONIN - Normal   POCT CBC       MDM     Medical Decision Making  The CBC, i-STAT, and troponin are all unremarkable.  The chest x-ray is negative.  The EKG shows no ischemic changes.  The patient was given Toradol with some relief.  His symptoms are most likely torticollis.  We discussed ice to the neck, gentle stretching exercises, and I will prescribe a Medrol Dosepak and a muscle relaxant to take as needed.  He lives with his family, they will be able to monitor him after taking the muscle relaxant as it may make him drowsy.  He will follow-up closely with his primary care doctor and for any worsening or concerning symptoms, go to the nearest emergency department for further evaluation.    Amount and/or Complexity of Data Reviewed  Independent Historian:      Details: Son  Labs: ordered.     Details: The CBC, i-STAT, and troponin are all unremarkable.  Radiology: ordered.     Details: I visualized the chest x-ray images which are negative for any pneumonia or other acute process.  ECG/medicine tests: ordered.     Details: HR 66  NSR   NO ST elevation or ischemic changes  No changes from previous  Discussion of management or test interpretation with external provider(s): I discussed this patient with Dr. Mendez.  He agrees with the plan of care.    Risk  OTC drugs.  Prescription drug management.  Risk Details: Torticollis versus acute MI        Disposition and Plan     Clinical Impression:  1. Torticollis         Disposition:  Discharge  9/22/2024  2:42 pm    Follow-up:  Sharad Du MD  [address] [place] [id number]  [phone]    Schedule an appointment as soon as possible for a visit in 2 days            Medications Prescribed:  Current Discharge Medication List        START taking these medications    Details   methylPREDNISolone (MEDROL)  4 MG Oral Tablet Therapy Pack Dosepack: take as directed  Qty: 1 each, Refills: 0      cyclobenzaprine 10 MG Oral Tab Take 1 tablet (10 mg total) by mouth 3 (three) times daily as needed for Muscle spasms.  Qty: 20 tablet, Refills: 0

## 2024-09-22 NOTE — DISCHARGE INSTRUCTIONS
Ice to the neck.  Gentle stretching exercises.  Take the medications as prescribed.  The muscle relaxant may make you drowsy, do not drive while taking.  Follow-up closely with your primary care doctor.  For any worsening or concerning symptoms, please go to the nearest emergency department for further evaluation.

## 2024-09-22 NOTE — ED INITIAL ASSESSMENT (HPI)
Pt c/o atraumatic left shoulder pain that radiates to his neck area started last night, pt is  unable to turn his neck to left side, denies fever

## 2024-10-09 NOTE — TELEPHONE ENCOUNTER
Called patient using language Romanian intepreter ID #[id number], left message to call back on voicemail.

## 2024-10-10 ENCOUNTER — TELEPHONE (OUTPATIENT)
Facility: CLINIC | Age: 69
End: 2024-10-10

## 2024-10-10 NOTE — TELEPHONE ENCOUNTER
GI RNs,     Could you please call Mr. Desai to check on him?  Gastric biopsies of 8/13/2024 showed H. pylori.  Dr. Sharad Du was kind enough to prescribe him the amoxicillin/clarithromycin treatment for H. pylori during a recent office visit.     If he filled and took the antibiotics, please recall for H. pylori breath test in 12 weeks.     - cb  =============================    Message sent to pt outreach to complete breath test on or around 11/26/2024

## 2024-10-21 RX ORDER — ERGOCALCIFEROL 1.25 MG/1
50000 CAPSULE, LIQUID FILLED ORAL WEEKLY
Qty: 12 CAPSULE | Refills: 0 | Status: SHIPPED | OUTPATIENT
Start: 2024-10-21

## 2024-10-21 NOTE — TELEPHONE ENCOUNTER
Please review. Protocol Failed; No Protocol    Medication(s) to Refill:   Requested Prescriptions     Pending Prescriptions Disp Refills    ERGOCALCIFEROL 1.25 MG (48722 UT) Oral Cap [Pharmacy Med Name: VITAMIN D2 50,000IU (ERGO) CAP RX] 12 capsule 0     Sig: TAKE 1 CAPSULE BY MOUTH 1 TIME A WEEK         Reason for Medication Refill being sent to Provider / Reason Protocol Failed:  [x] Non-Protocol Medication        Recent Labs:  Lab Results   Component Value Date    VITD 30.6 08/18/2023              Requested Prescriptions   Pending Prescriptions Disp Refills    ERGOCALCIFEROL 1.25 MG (53373 UT) Oral Cap [Pharmacy Med Name: VITAMIN D2 50,000IU (ERGO) CAP RX] 12 capsule 0     Sig: TAKE 1 CAPSULE BY MOUTH 1 TIME A WEEK       There is no refill protocol information for this order            Future Appointments         Provider Department Appt Notes    In 2 months Sharad Du MD [facility] 4mo follow up          Recent Outpatient Visits              [facility]    [facility]    7 months ago Medicare annual wellness visit, subsequent    [facility]    [facility]    8 months ago Gastroesophageal reflux disease, unspecified whether esophagitis present    [facility]    [facility]    1 year ago Pruritus    [facility], Kendall Gore MD    Office Visit    1 year ago Prostate cancer (HCC)    [facility]    [facility]

## 2024-11-14 ENCOUNTER — TELEPHONE (OUTPATIENT)
Facility: CLINIC | Age: 69
End: 2024-11-14

## 2024-11-15 NOTE — TELEPHONE ENCOUNTER
Timothy Lara MD  9/21/2024     Copy     GI RNs,     Could you please call Mr. Desai to check on him?  Gastric biopsies of 8/13/2024 showed H. pylori.  Dr. Sharad Du was kind enough to prescribe him the amoxicillin/clarithromycin treatment for H. pylori during a recent office visit.     If he filled and took the antibiotics, please recall for H. pylori breath test in 12 weeks.     - cb        Message sent to pt outreach to take h pylori breath test on or around 01/22/2025

## 2024-12-06 ENCOUNTER — OFFICE VISIT (OUTPATIENT)
Dept: FAMILY MEDICINE CLINIC | Facility: CLINIC | Age: 69
End: 2024-12-06

## 2024-12-06 VITALS
SYSTOLIC BLOOD PRESSURE: 112 MMHG | BODY MASS INDEX: 29 KG/M2 | HEIGHT: 63 IN | HEART RATE: 51 BPM | DIASTOLIC BLOOD PRESSURE: 69 MMHG

## 2024-12-06 DIAGNOSIS — J30.89 NON-SEASONAL ALLERGIC RHINITIS, UNSPECIFIED TRIGGER: Primary | ICD-10-CM

## 2024-12-06 DIAGNOSIS — Z23 NEED FOR INFLUENZA VACCINATION: ICD-10-CM

## 2024-12-06 DIAGNOSIS — F51.01 PRIMARY INSOMNIA: ICD-10-CM

## 2024-12-06 RX ORDER — FLUTICASONE PROPIONATE 50 MCG
2 SPRAY, SUSPENSION (ML) NASAL DAILY
Qty: 48 G | Refills: 2 | Status: SHIPPED | OUTPATIENT
Start: 2024-12-06

## 2024-12-06 RX ORDER — ZOLPIDEM TARTRATE 10 MG/1
10 TABLET ORAL NIGHTLY
Qty: 90 TABLET | Refills: 1 | Status: SHIPPED | OUTPATIENT
Start: 2024-12-06

## 2024-12-06 RX ORDER — HYDROXYZINE HYDROCHLORIDE 10 MG/1
10 TABLET, FILM COATED ORAL 3 TIMES DAILY PRN
Qty: 90 TABLET | Refills: 0 | Status: SHIPPED | OUTPATIENT
Start: 2024-12-06

## 2024-12-06 RX ORDER — LEVOCETIRIZINE DIHYDROCHLORIDE 5 MG/1
5 TABLET, FILM COATED ORAL DAILY
Qty: 90 TABLET | Refills: 2 | Status: SHIPPED | OUTPATIENT
Start: 2024-12-06

## 2024-12-06 NOTE — PROGRESS NOTES
12/6/2024  10:59 AM    Pranav Desai is a 68 year old male.    Chief complaint(s):   Chief Complaint   Patient presents with    Medication Follow-Up    Throat Problem     HPI:     Pranav Desai primary complaint is regarding as above.       Pranav Desai is here for evaluation of allergic rhinitis.  This condition started more than 4 years ago.  The allergy pattern seems to be none seasonal.  Patient symptom complex includes watery eye drainage, occular itching, itchy nose test, sneezing, runny nose, post-nasal drip and nasal congestion.  Known allergy triggers include molds and pollens.  Medications previously used include prescription for antihistamines.  Symptoms seem to improve the most with Xyzal.     In addition patient is requesting refills on his insomnia medication, Ambien.  Denies any adverse events on the medication, no sleep apnea or snoring.    In addition patient agreed to get his flu vaccination today.       HISTORY:  Past Medical History:    Cancer (HCC)    Prostate cancer 2013       Past Surgical History:   Procedure Laterality Date    Cholecystectomy  2012    Colonoscopy  10/2013    2018 /[facility] /[facility]    Colonoscopy N/A 8/13/2024    Procedure: COLONOSCOPY;  Surgeon: Timothy Lara MD;  Location: [facility] ENDOSCOPY    Hemorrhoidectomy      1991    Laparoscopic cholecystectomy  2012      Family History   Problem Relation Age of Onset    Cancer Father         Prostate cancer    Cancer Brother         Prosate cancer      Social History:   Social History     Socioeconomic History    Marital status:    Tobacco Use    Smoking status: Never     Passive exposure: Never    Smokeless tobacco: Never   Vaping Use    Vaping status: Never Used   Substance and Sexual Activity    Alcohol use: Not Currently    Drug use: Never        Immunizations:   Immunization History   Administered Date(s) Administered    Covid-19 Vaccine Pfizer 30 mcg/0.3 ml 02/09/2021, 03/02/2021    FLU VAC High Dose 65 YRS &  Older PRSV Free (56286) 11/25/2020, 12/08/2021, 10/11/2022    FLUZONE 6 months and older PFS 0.5 ml (76220) 10/11/2016, 11/01/2017    FLUZONE 6-35 Mos 0.25 ml Dose Quad Split PF (67639) 10/07/2015    Fluvirin, 3 Years & >, Im 11/11/2013    Fluzone Vaccine Medicare () 01/10/2020    High Dose Fluzone Influenza Vaccine, 65yr+ PF 0.5mL (42524) 10/07/2023    Influenza 10/07/2015    Pneumococcal Conjugate PCV20 10/11/2022    Pneumovax 23 01/10/2020    TDAP 11/08/2023    Zoster Vaccine Recombinant Adjuvanted (Shingrix) 03/12/2024   Pended Date(s) Pended    High Dose Fluzone Influenza Vaccine, 65yr+ PF 0.5mL (59831) 12/06/2024       Medications (Active prior to today's visit):  Current Outpatient Medications   Medication Sig Dispense Refill    hydrOXYzine 10 MG Oral Tab Take 1 tablet (10 mg total) by mouth 3 (three) times daily as needed for Itching. 90 tablet 0    levocetirizine (XYZAL) 5 MG Oral Tab Take 1 tablet (5 mg total) by mouth daily. 90 tablet 2    fluticasone propionate 50 MCG/ACT Nasal Suspension 2 sprays by Nasal route daily. 48 g 2    zolpidem 10 MG Oral Tab Take 1 tablet (10 mg total) by mouth nightly. 90 tablet 1    finasteride 5 MG Oral Tab Take 1 tablet (5 mg total) by mouth daily.      tamsulosin (FLOMAX) cap Take 1 capsule (0.4 mg total) by mouth daily.      ergocalciferol 1.25 MG (39775 UT) Oral Cap Take 1 capsule (50,000 Units total) by mouth once a week. 12 capsule 0    hydrocortisone (ANUSOL-HC) 2.5 % External Cream Place 1 Application rectally 2 (two) times daily. (Patient not taking: Reported on 12/6/2024) 28 g 1    albuterol 108 (90 Base) MCG/ACT Inhalation Aero Soln Inhale into the lungs every 6 (six) hours as needed for Wheezing.      triamcinolone 0.1 % External Ointment Applied 2 times per day as needed (Patient not taking: Reported on 12/6/2024) 60 g 0    Omeprazole 40 MG Oral Capsule Delayed Release Take 1 capsule (40 mg total) by mouth daily. 90 capsule 1    hydrOXYzine 25 MG Oral Tab  Take 1 tablet (25 mg total) by mouth nightly as needed for Itching. (Patient not taking: Reported on 12/6/2024) 90 tablet 1       Allergies:  Allergies[1]      ROS:   Review of Systems   Constitutional:  Negative for appetite change, fatigue and fever.   HENT:  Positive for congestion.    Eyes:  Positive for itching.   Respiratory:  Negative for cough, shortness of breath and wheezing.    Cardiovascular:  Negative for chest pain.   Gastrointestinal:  Negative for abdominal pain.   Musculoskeletal:  Negative for myalgias.   Skin:  Negative for rash.   Allergic/Immunologic: Positive for environmental allergies.   Neurological:  Negative for dizziness, weakness and headaches.       PHYSICAL EXAM:   VS: /69   Pulse 51   Ht 5' 3\" (1.6 m)   BMI 28.95 kg/m²     Physical Exam  Vitals reviewed.   Constitutional:       Appearance: Normal appearance. He is well-developed.   HENT:      Head: Normocephalic.      Nose: Nose normal.      Mouth/Throat:      Pharynx: Oropharynx is clear.   Eyes:      General: No scleral icterus.     Conjunctiva/sclera: Conjunctivae normal.   Cardiovascular:      Rate and Rhythm: Normal rate.   Pulmonary:      Effort: Pulmonary effort is normal.      Breath sounds: Normal breath sounds.   Musculoskeletal:      Cervical back: Neck supple.   Skin:     Findings: No rash.   Psychiatric:         Mood and Affect: Mood normal.         LABORATORY RESULTS:     EKG / Spirometry : -     Radiology: No results found.     ASSESSMENT/PLAN:   Assessment   Encounter Diagnoses   Name Primary?    Non-seasonal allergic rhinitis, unspecified trigger Yes    Primary insomnia     Need for influenza vaccination        1. Non-seasonal allergic rhinitis, unspecified trigger    MEDICATIONS:     Requested Prescriptions     Signed Prescriptions Disp Refills    hydrOXYzine 10 MG Oral Tab 90 tablet 0     Sig: Take 1 tablet (10 mg total) by mouth 3 (three) times daily as needed for Itching.    levocetirizine (XYZAL) 5 MG  Oral Tab 90 tablet 2     Sig: Take 1 tablet (5 mg total) by mouth daily.    fluticasone propionate 50 MCG/ACT Nasal Suspension 48 g 2     Si sprays by Nasal route daily.           LABORATORY & ORDERS:   Orders Placed This Encounter   Procedures    Influenza, High-Dose (Fluzone) [77932]   RECOMMENDATIONS given include: Patient was reassured of  his medical condition and all questions and concerns were answered. Patient was informed to please, call our office with any new or further questions or concerns that may come up in the near future. Notify Dr Du or the [facility] if there is a deterioration or worsening of the medical condition. Also, inform the doctor with any new symptoms or medications' side effects.      FOLLOW-UP: Schedule a follow-up visit in  prn.         2. Primary insomnia       MEDICATIONS:    zolpidem 10 MG Oral Tab 90 tablet 1     Sig: Take 1 tablet (10 mg total) by mouth nightly.        RECOMMENDATIONS given include: avoid sleeping on back, avoid alcohol, avoid caffeine, adhere to a proper sleep hygiene schedule, get regular exercise, maintain a sleep diary, reduce stress.   FOLLOW-UP: Advised to call if there is no improvement in 7 day(s).   Schedule follow-up appointments on a p.r.n. basis. 6months.          3. Need for influenza vaccination    Given:  Influenza, High-Dose (Fluzone) [16143]          Orders This Visit:  Orders Placed This Encounter   Procedures    Influenza, High-Dose (Fluzone) [76493]       Meds This Visit:  Requested Prescriptions     Signed Prescriptions Disp Refills    hydrOXYzine 10 MG Oral Tab 90 tablet 0     Sig: Take 1 tablet (10 mg total) by mouth 3 (three) times daily as needed for Itching.    levocetirizine (XYZAL) 5 MG Oral Tab 90 tablet 2     Sig: Take 1 tablet (5 mg total) by mouth daily.    fluticasone propionate 50 MCG/ACT Nasal Suspension 48 g 2     Si sprays by Nasal route daily.    zolpidem 10 MG Oral Tab 90 tablet 1     Sig: Take 1 tablet  (10 mg total) by mouth nightly.       Imaging & Referrals:  INFLUENZA VAC HIGH DOSE PRSV FREE         XUAN SANDOVAL MD         [1] No Known Allergies

## 2025-01-20 ENCOUNTER — TELEPHONE (OUTPATIENT)
Facility: CLINIC | Age: 70
End: 2025-01-20

## 2025-01-20 NOTE — TELEPHONE ENCOUNTER
Patient outreach message received:    Message sent to pt outreach to take h pylori breath test on or around 01/22/2025

## 2025-01-27 ENCOUNTER — LAB ENCOUNTER (OUTPATIENT)
Dept: LAB | Age: 70
End: 2025-01-27
Attending: INTERNAL MEDICINE
Payer: MEDICAID

## 2025-01-27 DIAGNOSIS — Z86.19 HISTORY OF HELICOBACTER PYLORI INFECTION: ICD-10-CM

## 2025-01-27 PROCEDURE — 83013 H PYLORI (C-13) BREATH: CPT

## 2025-01-28 LAB — H PYLORI BREATH TEST: NEGATIVE

## 2025-02-11 ENCOUNTER — LAB ENCOUNTER (OUTPATIENT)
Dept: LAB | Age: 70
End: 2025-02-11
Attending: UROLOGY
Payer: MEDICARE

## 2025-02-11 DIAGNOSIS — C61 PROSTATE CANCER (HCC): Primary | ICD-10-CM

## 2025-02-11 LAB — PSA SERPL-MCNC: 2.1 NG/ML (ref ?–4)

## 2025-02-11 PROCEDURE — 36415 COLL VENOUS BLD VENIPUNCTURE: CPT

## 2025-02-11 PROCEDURE — 84153 ASSAY OF PSA TOTAL: CPT

## 2025-02-20 ENCOUNTER — TELEPHONE (OUTPATIENT)
Dept: FAMILY MEDICINE CLINIC | Facility: CLINIC | Age: 70
End: 2025-02-20

## 2025-03-01 ENCOUNTER — TELEPHONE (OUTPATIENT)
Facility: CLINIC | Age: 70
End: 2025-03-01

## 2025-03-01 NOTE — TELEPHONE ENCOUNTER
GI RNs -  Please call Mr Lloyd to advise that the recent H. pylori breath test was negative.  The antibiotics prescribed September 2024 have cleared the H. pylori infection.    - cb

## 2025-03-10 NOTE — TELEPHONE ENCOUNTER
Patient son is returning RN's call.  He states that he received results through [employer] messages.

## 2025-03-12 ENCOUNTER — LAB ENCOUNTER (OUTPATIENT)
Dept: LAB | Age: 70
End: 2025-03-12
Attending: FAMILY MEDICINE
Payer: MEDICARE

## 2025-03-12 ENCOUNTER — OFFICE VISIT (OUTPATIENT)
Dept: FAMILY MEDICINE CLINIC | Facility: CLINIC | Age: 70
End: 2025-03-12

## 2025-03-12 VITALS
DIASTOLIC BLOOD PRESSURE: 60 MMHG | BODY MASS INDEX: 28.91 KG/M2 | HEIGHT: 63 IN | WEIGHT: 163.19 LBS | SYSTOLIC BLOOD PRESSURE: 110 MMHG

## 2025-03-12 DIAGNOSIS — F51.01 PRIMARY INSOMNIA: ICD-10-CM

## 2025-03-12 DIAGNOSIS — N52.9 ERECTILE DYSFUNCTION, UNSPECIFIED ERECTILE DYSFUNCTION TYPE: ICD-10-CM

## 2025-03-12 DIAGNOSIS — C61 PROSTATE CANCER (HCC): ICD-10-CM

## 2025-03-12 DIAGNOSIS — M15.0 PRIMARY OSTEOARTHRITIS INVOLVING MULTIPLE JOINTS: ICD-10-CM

## 2025-03-12 DIAGNOSIS — R73.9 HYPERGLYCEMIA: ICD-10-CM

## 2025-03-12 DIAGNOSIS — H53.8 BLURRED VISION: ICD-10-CM

## 2025-03-12 DIAGNOSIS — Z00.00 MEDICARE ANNUAL WELLNESS VISIT, SUBSEQUENT: Primary | ICD-10-CM

## 2025-03-12 DIAGNOSIS — R61 NIGHT SWEATS: ICD-10-CM

## 2025-03-12 DIAGNOSIS — E55.9 VITAMIN D DEFICIENCY: ICD-10-CM

## 2025-03-12 DIAGNOSIS — H43.399 VITREOUS FLOATERS, UNSPECIFIED LATERALITY: ICD-10-CM

## 2025-03-12 DIAGNOSIS — Z12.11 COLON CANCER SCREENING: ICD-10-CM

## 2025-03-12 DIAGNOSIS — R68.82 DECREASED LIBIDO: ICD-10-CM

## 2025-03-12 DIAGNOSIS — R97.20 ELEVATED PSA: ICD-10-CM

## 2025-03-12 LAB
ALBUMIN SERPL-MCNC: 4.5 G/DL (ref 3.2–4.8)
ALBUMIN/GLOB SERPL: 1.5 {RATIO} (ref 1–2)
ALP LIVER SERPL-CCNC: 82 U/L
ALT SERPL-CCNC: 18 U/L
ANION GAP SERPL CALC-SCNC: 5 MMOL/L (ref 0–18)
AST SERPL-CCNC: 18 U/L (ref ?–34)
BASOPHILS # BLD AUTO: 0.02 X10(3) UL (ref 0–0.2)
BASOPHILS NFR BLD AUTO: 0.4 %
BILIRUB SERPL-MCNC: 0.8 MG/DL (ref 0.2–1.1)
BILIRUB UR QL: NEGATIVE
BUN BLD-MCNC: 10 MG/DL (ref 9–23)
BUN/CREAT SERPL: 10.9 (ref 10–20)
CALCIUM BLD-MCNC: 9.6 MG/DL (ref 8.7–10.4)
CHLORIDE SERPL-SCNC: 106 MMOL/L (ref 98–112)
CHOLEST SERPL-MCNC: 148 MG/DL (ref ?–200)
CLARITY UR: CLEAR
CO2 SERPL-SCNC: 27 MMOL/L (ref 21–32)
COLOR UR: COLORLESS
CREAT BLD-MCNC: 0.92 MG/DL
DEPRECATED RDW RBC AUTO: 40.3 FL (ref 35.1–46.3)
EGFRCR SERPLBLD CKD-EPI 2021: 89 ML/MIN/1.73M2 (ref 60–?)
EOSINOPHIL # BLD AUTO: 0.11 X10(3) UL (ref 0–0.7)
EOSINOPHIL NFR BLD AUTO: 2.5 %
ERYTHROCYTE [DISTWIDTH] IN BLOOD BY AUTOMATED COUNT: 12.4 % (ref 11–15)
EST. AVERAGE GLUCOSE BLD GHB EST-MCNC: 103 MG/DL (ref 68–126)
FASTING PATIENT LIPID ANSWER: YES
FASTING STATUS PATIENT QL REPORTED: YES
GLOBULIN PLAS-MCNC: 3 G/DL (ref 2–3.5)
GLUCOSE BLD-MCNC: 92 MG/DL (ref 70–99)
GLUCOSE UR-MCNC: NORMAL MG/DL
HBA1C MFR BLD: 5.2 % (ref ?–5.7)
HCT VFR BLD AUTO: 41.3 %
HDLC SERPL-MCNC: 52 MG/DL (ref 40–59)
HGB BLD-MCNC: 14.2 G/DL
HGB UR QL STRIP.AUTO: NEGATIVE
IMM GRANULOCYTES # BLD AUTO: 0.01 X10(3) UL (ref 0–1)
IMM GRANULOCYTES NFR BLD: 0.2 %
KETONES UR-MCNC: NEGATIVE MG/DL
LDLC SERPL CALC-MCNC: 81 MG/DL (ref ?–100)
LEUKOCYTE ESTERASE UR QL STRIP.AUTO: NEGATIVE
LYMPHOCYTES # BLD AUTO: 1.96 X10(3) UL (ref 1–4)
LYMPHOCYTES NFR BLD AUTO: 43.9 %
MCH RBC QN AUTO: 30.2 PG (ref 26–34)
MCHC RBC AUTO-ENTMCNC: 34.4 G/DL (ref 31–37)
MCV RBC AUTO: 87.9 FL
MONOCYTES # BLD AUTO: 0.53 X10(3) UL (ref 0.1–1)
MONOCYTES NFR BLD AUTO: 11.9 %
NEUTROPHILS # BLD AUTO: 1.83 X10 (3) UL (ref 1.5–7.7)
NEUTROPHILS # BLD AUTO: 1.83 X10(3) UL (ref 1.5–7.7)
NEUTROPHILS NFR BLD AUTO: 41.1 %
NITRITE UR QL STRIP.AUTO: NEGATIVE
NONHDLC SERPL-MCNC: 96 MG/DL (ref ?–130)
OSMOLALITY SERPL CALC.SUM OF ELEC: 285 MOSM/KG (ref 275–295)
PH UR: 5 [PH] (ref 5–8)
PLATELET # BLD AUTO: 161 10(3)UL (ref 150–450)
POTASSIUM SERPL-SCNC: 4.3 MMOL/L (ref 3.5–5.1)
PROT SERPL-MCNC: 7.5 G/DL (ref 5.7–8.2)
PROT UR-MCNC: NEGATIVE MG/DL
RBC # BLD AUTO: 4.7 X10(6)UL
SODIUM SERPL-SCNC: 138 MMOL/L (ref 136–145)
SP GR UR STRIP: 1.01 (ref 1–1.03)
TRIGL SERPL-MCNC: 80 MG/DL (ref 30–149)
TSI SER-ACNC: 4.17 UIU/ML (ref 0.55–4.78)
UROBILINOGEN UR STRIP-ACNC: NORMAL
VIT D+METAB SERPL-MCNC: 29.1 NG/ML (ref 30–100)
VLDLC SERPL CALC-MCNC: 13 MG/DL (ref 0–30)
WBC # BLD AUTO: 4.5 X10(3) UL (ref 4–11)

## 2025-03-12 PROCEDURE — 80053 COMPREHEN METABOLIC PANEL: CPT

## 2025-03-12 PROCEDURE — 83036 HEMOGLOBIN GLYCOSYLATED A1C: CPT

## 2025-03-12 PROCEDURE — 81003 URINALYSIS AUTO W/O SCOPE: CPT

## 2025-03-12 PROCEDURE — 84153 ASSAY OF PSA TOTAL: CPT

## 2025-03-12 PROCEDURE — 85025 COMPLETE CBC W/AUTO DIFF WBC: CPT

## 2025-03-12 PROCEDURE — 84154 ASSAY OF PSA FREE: CPT

## 2025-03-12 PROCEDURE — 80061 LIPID PANEL: CPT

## 2025-03-12 PROCEDURE — 36415 COLL VENOUS BLD VENIPUNCTURE: CPT

## 2025-03-12 PROCEDURE — 84443 ASSAY THYROID STIM HORMONE: CPT

## 2025-03-12 PROCEDURE — 82306 VITAMIN D 25 HYDROXY: CPT

## 2025-03-12 RX ORDER — FAMOTIDINE 40 MG/1
40 TABLET, FILM COATED ORAL DAILY
Qty: 30 TABLET | Refills: 1 | Status: SHIPPED | OUTPATIENT
Start: 2025-03-12

## 2025-03-12 NOTE — PROGRESS NOTES
Subjective:   Pranav Desai is a 69 year old male who presents for a MA AHA (Medicare Advantage Annual Health Assessment) and Subsequent Annual Wellness visit (Pt already had Initial Annual Wellness) and scheduled follow up of multiple significant but stable problems.     Pranav Desai is a 69 year old male is here for routine periodic health screening and examination.  His last physical exam was 1 years ago.  His last ECG was none. His last diabetes screening test was none.  His last cholesterol test was uncertain year ago and was normal.  Last dentist visit was > 48 months ago.  He is not current with his Td immunization, 2023  He is current with his Flu vaccination and is interested in receiving it today.  Patient last colonoscopy was 1 years ago. He is not a smoker.        History/Other:   Fall Risk Assessment:   He has been screened for Falls and is High Risk. Fall Prevention information provided to patient in After Visit Summary.    Do you feel unsteady when standing or walking?: No  Do you worry about falling?: Yes  Have you fallen in the past year?: No     Cognitive Assessment:   He had a completely normal cognitive assessment - see flowsheet entries     Functional Ability/Status:   Pranav Desai has some abnormal functions as listed below:  He has difficulties Affording Meds based on screening of functional status.       Depression Screening (PHQ):  PHQ-2 SCORE: 0  , done 3/12/2025             Advanced Directives:   He does NOT have a Living Will. [Do you have a living will?: No]  He does NOT have a Power of  for Health Care. [Do you have a healthcare power of ?: No]  Discussed Advance Care Planning with patient (and family/surrogate if present). Standard forms made available to patient in After Visit Summary.      There is no problem list on file for this patient.    Allergies:  He has No Known Allergies.    Current Medications:  Outpatient Medications Marked as Taking for the 3/12/25  encounter (Office Visit) with Sharad Du MD   Medication Sig    famotidine 40 MG Oral Tab Take 1 tablet (40 mg total) by mouth daily.    hydrOXYzine 10 MG Oral Tab Take 1 tablet (10 mg total) by mouth 3 (three) times daily as needed for Itching.    levocetirizine (XYZAL) 5 MG Oral Tab Take 1 tablet (5 mg total) by mouth daily.    fluticasone propionate 50 MCG/ACT Nasal Suspension 2 sprays by Nasal route daily.    zolpidem 10 MG Oral Tab Take 1 tablet (10 mg total) by mouth nightly.    ergocalciferol 1.25 MG (70199 UT) Oral Cap Take 1 capsule (50,000 Units total) by mouth once a week.    albuterol 108 (90 Base) MCG/ACT Inhalation Aero Soln Inhale into the lungs every 6 (six) hours as needed for Wheezing.    hydrOXYzine 25 MG Oral Tab Take 1 tablet (25 mg total) by mouth nightly as needed for Itching.    finasteride 5 MG Oral Tab Take 1 tablet (5 mg total) by mouth daily.       Medical History:  He  has a past medical history of Cancer (HCC).  Surgical History:  He  has a past surgical history that includes hemorrhoidectomy; Laparoscopic cholecystectomy (2012); colonoscopy (10/2013); cholecystectomy (2012); and colonoscopy (N/A, 8/13/2024).   Family History:  His family history includes Cancer in his brother and father.  Social History:  He  reports that he has never smoked. He has never been exposed to tobacco smoke. He has never used smokeless tobacco. He reports that he does not currently use alcohol. He reports that he does not use drugs.    Tobacco:       CAGE Alcohol Screen:   CAGE screening score of 0 on 3/12/2025, showing low risk of alcohol abuse.      Patient Care Team:  Sharad Du MD as PCP - General (Family Medicine)    Review of Systems   Constitutional:  Negative for appetite change, fatigue and fever.   HENT:  Negative for hearing loss and nosebleeds.    Eyes:  Negative for pain and visual disturbance.   Respiratory:  Negative for apnea and shortness of breath.    Cardiovascular:   Negative for chest pain, palpitations and leg swelling.   Gastrointestinal:  Negative for abdominal pain, blood in stool, constipation, diarrhea, nausea and vomiting.   Endocrine: Negative for polydipsia and polyuria.   Genitourinary:  Negative for decreased urine volume, frequency and hematuria.        No nocturia   Musculoskeletal:  Negative for arthralgias.   Skin:  Negative for rash.   Neurological:  Negative for dizziness, syncope and headaches.   Psychiatric/Behavioral:  Negative for dysphoric mood and sleep disturbance.           Objective:   Physical Exam  Vitals reviewed.   Constitutional:       Appearance: Normal appearance.      Comments:      HENT:      Head: Normocephalic.      Right Ear: Hearing, tympanic membrane and ear canal normal.      Left Ear: Hearing, tympanic membrane and ear canal normal.      Nose: Nose normal. No rhinorrhea.      Mouth/Throat:      Mouth: Mucous membranes are moist.      Pharynx: Oropharynx is clear.   Eyes:      Extraocular Movements: Extraocular movements intact.      Conjunctiva/sclera: Conjunctivae normal.      Pupils: Pupils are equal, round, and reactive to light.   Neck:      Thyroid: No thyroid mass.      Vascular: No carotid bruit.   Cardiovascular:      Rate and Rhythm: Normal rate and regular rhythm.      Heart sounds: Normal heart sounds, S1 normal and S2 normal. No murmur heard.  Pulmonary:      Effort: Pulmonary effort is normal.      Breath sounds: Normal breath sounds.   Abdominal:      General: Abdomen is flat. Bowel sounds are normal.      Palpations: Abdomen is soft. There is no hepatomegaly, splenomegaly or mass.      Tenderness: There is no abdominal tenderness.      Hernia: No hernia is present.   Musculoskeletal:      Cervical back: Neck supple.      Comments: Spinal exam without scoliosis.      Lymphadenopathy:      Cervical: No cervical adenopathy.   Skin:     General: Skin is warm.      Findings: No rash.   Neurological:      General: No focal  deficit present.      Mental Status: He is alert.      Deep Tendon Reflexes:      Reflex Scores:       Patellar reflexes are 2+ on the right side and 2+ on the left side.  Psychiatric:         Attention and Perception: Attention normal.         Mood and Affect: Mood and affect normal.          /60   Ht 5' 3\" (1.6 m)   Wt 163 lb 3.2 oz (74 kg)   BMI 28.91 kg/m²  Estimated body mass index is 28.91 kg/m² as calculated from the following:    Height as of this encounter: 5' 3\" (1.6 m).    Weight as of this encounter: 163 lb 3.2 oz (74 kg).    Medicare Hearing Assessment:   Hearing Screening    Screening Method: Questionnaire  I have a problem hearing over the telephone: No I have trouble following the conversations when two or more people are talking at the same time: No   I have trouble understanding things on the TV: No I have to strain to understand conversations: No   I have to worry about missing the telephone ring or doorbell: No I have trouble hearing conversations in a noisy background such as a crowded room or restaurant: No   I get confused about where sounds come from: No I misunderstand some words in a sentence and need to ask people to repeat themselves: No   I especially have trouble understanding the speech of women and children: No I have trouble understanding the speaker in a large room such as at a meeting or place of Congregation: No   Many people I talk to seem to mumble (or don't speak clearly): No People get annoyed because I misunderstand what they say: No   I misunderstand what others are saying and make inappropriate responses: No I avoid social activities because I cannot hear well and fear I will reply improperly: No   Family members and friends have told me they think I may have hearing loss: No             Visual Acuity:   Right Eye Visual Acuity: Uncorrected Right Eye Chart Acuity: 20/25   Left Eye Visual Acuity: Uncorrected Left Eye Chart Acuity: 20/40   Both Eyes Visual Acuity:  Uncorrected Both Eyes Chart Acuity: 20/25   Able To Tolerate Visual Acuity: Yes        Assessment & Plan:   Pranav Desai is a 69 year old male who presents for a Medicare Assessment.     1. Medicare annual wellness visit, subsequent (Primary)  2. Prostate cancer (HCC)  -     CBC With Differential With Platelet; Future; Expected date: 03/12/2025  -     PSA, Total W Reflex To Free; Future; Expected date: 03/12/2025  -     Urinalysis with Culture Reflex; Future; Expected date: 03/12/2025  3. Elevated PSA  -     PSA, Total W Reflex To Free; Future; Expected date: 03/12/2025  4. Decreased libido  5. Erectile dysfunction, unspecified erectile dysfunction type  6. Primary insomnia  -     TSH W Reflex To Free T4; Future; Expected date: 03/12/2025  7. Hyperglycemia  -     Comp Metabolic Panel (14); Future; Expected date: 03/12/2025  -     Hemoglobin A1C; Future; Expected date: 03/12/2025  -     Lipid Panel; Future; Expected date: 03/12/2025  8. Primary osteoarthritis involving multiple joints  9. Colon cancer screening  10. Vitreous floaters, unspecified laterality  11. Blurred vision  12. Vitamin D deficiency  -     Vitamin D; Future; Expected date: 03/12/2025  13. Night sweats  Other orders  -     Famotidine; Take 1 tablet (40 mg total) by mouth daily.  Dispense: 30 tablet; Refill: 1    1. Medicare annual wellness visit, subsequent      CPE PLAN:    LABS / TEST & ORDERS for today's visit :Blood test(s) ordered today for send out to [facility] lab:  Orders Placed This Encounter   Procedures    CBC With Differential With Platelet    Comp Metabolic Panel (14)    Hemoglobin A1C    Lipid Panel    PSA, Total W Reflex To Free    TSH W Reflex To Free T4    Vitamin D    Urinalysis with Culture Reflex     In-House; Urine dip.  Test/Procedures done today include: EKG.    IMMUNIZATIONS: none given today.    RECOMMENDATIONS given include: ANTICIPATORY GUIDANCE  topics covered today include: safety (i.e. seat belts, helmets,  sunscreen, protective sports gear ), nutrition (i.e. healthy meals and snacks (i.e. avoid junk food and high-carbohydrate foods); athletic conditioning, fluids; low fat milk, limit to less than 20 oz. a day; dental care ), and Healthy habits& Social competence & Responsibilities: Recommendations on physical activity; exercise daily or at least 3 times a week for 30-60 minutes doing cardiovascular exercise. Encourage to maintain the best physical and dental hygiene possible.     FOLLOW-UP: Schedule a follow-up visit in 12 months.     2. Prostate cancer (HCC)  Stable  CPM  Follow up with Urologist  Lab:   - CBC With Differential With Platelet; Future  - PSA, Total W Reflex To Free; Future  - Urinalysis with Culture Reflex; Future    3. Elevated PSA  Stable  CPM  Follow up with Urologist  Lab: PSA, Total W Reflex To Free; Future    4. Decreased libido  CPM    5. Erectile dysfunction, unspecified erectile dysfunction type  CPM    6. Primary insomnia  Stable  CPM  Follow up [facility]  Lab: TSH W Reflex To Free T4; Future    7. Hyperglycemia  Lab:   - Comp Metabolic Panel (14); Future  - Hemoglobin A1C; Future  - Lipid Panel; Future    8. Primary osteoarthritis involving multiple joints  Stable  CPM  Follow up [facility]    9. Colon cancer screening  S/p colonoscopy    10. Vitreous floaters, unspecified laterality  11. Blurred vision  Follow up with ophthalmology    12. Vitamin D deficiency  Lab:  Vitamin D; Future    13. Night sweats  Resolved     The patient indicates understanding of these issues and agrees to the plan.  Further testing ordered.  Lab work ordered.  Reinforced healthy diet, lifestyle, and exercise.      Return in about 6 months (around 9/12/2025).     XUAN SANDOVAL MD, 3/12/2025     Supplementary Documentation:   General Health:  In the past six months, have you lost more than 10 pounds without trying?: 2 - No  Has your appetite been poor?: No  Type of Diet: Balanced, Vegetarian  How does the patient maintain a  good energy level?: Appropriate Exercise, Daily Walks  How would you describe your daily physical activity?: Moderate  How would you describe your current health state?: Fair  How do you maintain positive mental well-being?: Social Interaction, Visiting Family  On a scale of 0 to 10, with 0 being no pain and 10 being severe pain, what is your pain level?: 3 - (Mild)  In the past six months, have you experienced urine leakage?: 0-No  At any time do you feel concerned for the safety/well-being of yourself and/or your children, in your home or elsewhere?: No  Have you had any immunizations at another office such as Influenza, Hepatitis B, Tetanus, or Pneumococcal?: No    Health Maintenance   Topic Date Due    Zoster Vaccines (2 of 2) 05/07/2024    COVID-19 Vaccine (3 - 2024-25 season) 09/01/2024    Annual Well Visit  01/01/2025    Annual Depression Screening  01/01/2025    Fall Risk Screening (Annual)  01/01/2025    PSA  02/11/2027    Colorectal Cancer Screening  08/13/2034    Influenza Vaccine  Completed    Pneumococcal Vaccine: 50+ Years  Completed    Meningococcal B Vaccine  Aged Out

## 2025-03-13 RX ORDER — ERGOCALCIFEROL 1.25 MG/1
CAPSULE, LIQUID FILLED ORAL
Qty: 12 CAPSULE | Refills: 3 | Status: SHIPPED | OUTPATIENT
Start: 2025-03-13

## 2025-03-14 LAB
PROSTATE SPECIFIC AG: 2 NG/ML
PROSTATE SPECIFIC AG: 2 NG/ML

## 2025-04-18 NOTE — TELEPHONE ENCOUNTER
Called patient  at  [phone] with [employer]  [place]  ID# [id number]        Left Voicemail to call back our office. Office phone number provided with office telephone hours.      1st attempt    Staff- please ask patient if he requested refill for Amoxicillin and if so why?

## 2025-04-21 RX ORDER — AMOXICILLIN 500 MG/1
1000 CAPSULE ORAL 2 TIMES DAILY
Qty: 56 CAPSULE | Refills: 0 | OUTPATIENT
Start: 2025-04-21

## 2025-05-02 ENCOUNTER — LAB ENCOUNTER (OUTPATIENT)
Dept: LAB | Age: 70
End: 2025-05-02
Attending: UROLOGY
Payer: MEDICARE

## 2025-05-02 DIAGNOSIS — C61 PROSTATE CANCER (HCC): Primary | ICD-10-CM

## 2025-05-02 LAB — PSA SERPL-MCNC: 2.31 NG/ML (ref ?–4)

## 2025-05-02 PROCEDURE — 36415 COLL VENOUS BLD VENIPUNCTURE: CPT

## 2025-05-02 PROCEDURE — 84153 ASSAY OF PSA TOTAL: CPT

## 2025-08-07 ENCOUNTER — TELEPHONE (OUTPATIENT)
Dept: FAMILY MEDICINE CLINIC | Facility: CLINIC | Age: 70
End: 2025-08-07

## (undated) DEVICE — SYRINGE, LUER SLIP, STERILE, 60ML: Brand: MEDLINE

## (undated) DEVICE — GIJAW SINGLE-USE BIOPSY FORCEPS WITH NEEDLE: Brand: GIJAW

## (undated) DEVICE — YANKAUER,BULB TIP,W/O VENT,RIGID,STERILE: Brand: MEDLINE

## (undated) DEVICE — CO2 CANNULA,SSOFT,ADLT,7O2,4CO2,FEMALE: Brand: MEDLINE

## (undated) DEVICE — CONMED SCOPE SAVER BITE BLOCK, 20X27 MM: Brand: SCOPE SAVER

## (undated) DEVICE — KIT CLEAN ENDOKIT 1.1OZ GOWNX2

## (undated) DEVICE — MEDI-VAC NON-CONDUCTIVE SUCTION TUBING: Brand: CARDINAL HEALTH

## (undated) DEVICE — KIT ENDO ORCAPOD 160/180/190

## (undated) DEVICE — MEDI-VAC NON-CONDUCTIVE SUCTION TUBING 6MM X 1.8M (6FT.) L: Brand: CARDINAL HEALTH

## (undated) DEVICE — STERILE LATEX POWDER-FREE SURGICAL GLOVESWITH NITRILE COATING: Brand: PROTEXIS

## (undated) NOTE — LETTER
01/10/22        [facility]      Dear Wayne Montiel,    Our records indicate that you have outstanding lab work and or testing that was ordered for you and has not yet been completed:  Orders Placed This Encounter      [facility]

## (undated) NOTE — LETTER
[facility]   Administracion de Anestesia  Yo, Pranav Desai, acepto ser atendido por un anestesiólogo, quien está especialmente capacitado para monitorearme y darme medicamento para hacerme dormir o mantenerme cómodo mary mi procedimiento.   Entiendo que mi anestesiólogo no es un empleado o agente de [facility]. Él o dung trabaja para [facility]  [place] el paciente que solicita los servicios de anestesia, estoy de acuerdo con lo siguiente:  Permitir al anestesiólogo (médico de anestesia) que me suministre el medicamento y hacer los procedimientos adicionales que martina necesarios. Algunos ejemplos son: Al iniciar o utilizar jericho “IV” para suministrarme medicamentos, fluidos o bobbi mary mi procedimiento, y colocarme jericho sonda de respiración, me ayudará a respirar cuando esté dormido (intubación). En el delilah de que mi corazón deje de funcionar correctamente, entiendo que mi anestesiólogo hará todo lo posible para salvar mi jose manuel, a menos que los documentos de No resucitar de [facility] lo indiquen de otra manera.  Informar a mi anestesista antes del procedimiento:  Si estoy embarazada.  La última vez que comí o bebí algo.  Todos los medicamentos que jm (incluyendo medicamentos recetados, suplementos herbales y pastillas que puedo comprar sin receta médica (incluyendo drogas en la day [medicamentos ilegales]). No informar a mi anestesiólogo acerca de estos medicamentos puede aumentar mi riesgo de complicaciones con la anestesia.  Si soy alérgico a cualquier cosa o he tenido previamente jericho reacción adversa a la anestesia.  Entiendo cómo la anestesia me ayudará (beneficios).  Entiendo que con cualquier tipo de anestesia hay riesgos:  Los riesgos más comunes son: náuseas, vómitos, dolor de garganta, dolor muscular, daño a los ojos, la boca o los dientes (por la colocación de la sonda de respiración).  Los riesgos poco comunes incluyen: recordar  lo que sucedió mary mi procedimiento, reacciones alérgicas a los medicamentos, lesiones en las vías respiratorias, el corazón, los pulmones, la visión, los nervios o músculos, y en casos sumamente raros, la muerte.  Mii médico me ha explicado otras opciones de atención disponibles para mí (alternativas).  Pacientes embarazadas (“epidural”):    Entiendo que los riesgos de recibir jericho inyección epidural (medicamento que se aplica en la espalda para ayudar a controlar el dolor mary el parto), incluyen picazón, presión arterial baja, dificultad para orinar, dolor de lei o disminución en el ritmo del corazón del bebé. Los riesgos muy poco comunes incluyen infección, hemorragia, convulsiones, ritmo cardíaco irregular y lesión del nervio.  Anestesia regional (“columna vertebral”, “epidural” y “bloqueo de los nervios”):  Entiendo que hay complicaciones poco frecuentes aysha posibles, e incluyen dolor de lei, sangrado, infección, convulsiones, ritmo cardíaco irregular y la lesión del nervio.  .   Puedo cambiar de opinión acerca de recibir servicios de anestesia en cualquier momento antes de que se me administre el medicamento.         Paciente (o representante) Firma/Relación con el paciente  Fecha  Hora  Patient Signature/Signature of Responsible person Date Time           Nombre del intérprete (en whitaker delilah)  Idioma/Organización  Hora  Name of  Language/Organization Time          Firma del anestesiólogo Fecha  Hora  Signature of anesthesiologist Date Time    He hablado del procedimiento y la información anterior con el paciente (o el representante del paciente) y respondí darlene preguntas. El paciente o whitaker representante ha aceptado recibir los servicios de anestesia.         Testigo Fecha  Hora  Witness Date Time  He verificado que la firma es la del paciente o del representante del paciente, y que se firmó antes del procedimiento.    Nombre del paciente: Pranav diamond Nac.: 12/7/1955                  En letra de imprenta: August 8, 2024  Expediente médico No. [id number]                         Página 1 de 2  ----------ANESTHESIA CONSENT----------

## (undated) NOTE — LETTER
Patient Name: Pranav Desai : 1955  Medical Record #: [id number] Printed: 2024  Page 1 of 2                                          [facility]  [address], [place], [place]  Autorización para operación y procedimiento quirúrgico                                                                                                      Por la presente, autorizo a Timothy Lara MD, mi médico y al asistente a realizar la operación/procedimiento quirúrgico a continuación, así jona a administrar la anestesia que determine necesaria mi médico Nombre (s) de la operación/procedimiento: COLONOSCOPY/ ESOPHAGOGASTRODUODENOSCOPY en Pranav Desai     Reconozco que mary la operación/procedimiento quirúrgico, las condiciones imprevistas pueden requerir de procedimientos adicionales o diferentes a aquellos mencionados anteriormente.  Por lo tanto, autorizo y solicito además que el cirujano antes mencionado, los asistentes o las personas designadas realicen los procedimientos que, a whitaker juicio, martina necesarios y convenientes.    Mi cirujano/médico dorsey discutido antes de mi cirugía los posibles beneficios, riesgos y efectos secundarios de hira procedimiento, la probabilidad de alcanzar las metas y los posibles problemas que puedan ocurrir mary la recuperación.  Ellos también betancourt discutido las alternativas razonables al procedimiento, incluso los riesgos, beneficios y efectos secundarios relacionados con las alternativas y los riesgos relacionados con no realizar hira procedimiento.  Betancourt respondido a todas mis preguntas y confirmo que no se ha dado ninguna garantía en cuanto a los resultados que pueda obtener.    En delilah de que surja la necesidad mary mi operación o mary el periodo postoperatorio, también autorizo se aplique bobbi y/o productos sanguíneos.  Asimismo, entiendo que a pesar de las cuidadosas pruebas y análisis de bobbi o de los productos sanguíneos que realizan las  entidades recolectoras, todavía puedo estar sujeto a efectos adversos jona resultado de recibir jericho transfusión de bobbi y/o productos sanguíneos.  A continuación se mencionan algunos, aunque no todos, los riesgos potenciales que pueden ocurrir: fiebre y reacciones alérgicas, reacciones hemolíticas, trasmisión de enfermedades jona hepatitis, SIDA y citomegalovirus (CMV), así jona sobrecarga de líquidos.   En delilah de que desee tener jericho transfusión autóloga de mi propia bobbi o jericho transfusión de un donante dirigido.  Lo discutiré con mi médico.  Check only if Refusing Blood or Blood Products  I understand refusal of blood or blood products as deemed necessary by my physician may have serious consequences to my condition to include possible death. I hereby assume responsibility for my refusal and release the hospital, its personnel, and my physicians from any responsibility for the consequences of my refusal.           o  Refuse       Autorizo el uso de cualquier muestra, órgano, tejido, parte del cuerpo u objeto extraño que pueda ser extraído de mi cuerpo mary la operación/procedimiento para fines de diagnóstico, investigación o de enseñanza y whitaker desecho posterior por las autoridades del hospital.  También, autorizo la revelación de los resultados de las pruebas de muestras y/o los informes escritos al médico tratante jona personal médico del hospital u otros médicos de referencia o consulta involucrados en mi atención, a discreción del patólogo o de mi médico tratante.    Doy consentimiento para que se fotografíen o graben vídeos de las operaciones o procedimientos a realizarse, incluidas las partes de mi cuerpo que martina adecuadas para propósitos médicos, científicos o educativos, en el entendido de que, mi identidad no sea revelada por las fotografías o por textos descriptivos que las acompañen.  Si el procedimiento es fotografiado o grabado en vídeo, el cirujano obtendrá la imagen, cinta de vídeo o CD  original.  El hospital no se hará responsable por el almacenamiento, la revelación o el mantenimiento de la imagen, cinta o CD.    Autorizo la presencia de un especialista de producto u observadores en el quirófano, según lo considere necesario mi médico o las personas que éste designe.     Reconozco que en delilah de que mi procedimiento resulte en un tiempo prolongado de radiografía/fluoroscopia, puedo desarrollar jericho reacción en la piel.    Si tengo jericho orden de No intentar la reanimación (FALGUNI), beth estado se suspenderá mientras esté en el quirófano, la grady de procedimientos y mary el periodo de recuperación a menos que yo indique lo contrario explícitamente (o jericho persona autorizada a grayson el consentimiento en mi nombre). El cirujano o mi médico tratante determinarán cuándo termina el periodo de recuperación aplicable a los efectos de restablecer la orden de FALGUNI.  Pacientes que se realizan un procedimiento de esterilización: Entiendo que si el procedimiento tiene éxito, los resultados serán permanentes y que, por lo tanto, me será imposible inseminar, concebir o tener hijos.  Asimismo, entiendo que el procedimiento tiene jona propósito la esterilidad, aunque el resultado no está garantizado.   Admito que mi médico me ha explicado la aplicación de sedación/analgesia, incluidos los riesgos y beneficios y, consiento a la administración de sedación/analgesia conforme sea necesario o conveniente a juicio de mi médico.      CERTIFICO QUE HE LEÍDO Y COMPRENDIDO EL CONSENTIMIENTO ANTERIOR PARA LA OPERACIÓN y/o PROCEDIMIENTO.             ______________________________________  _______________________________  Firma del paciente      Firma de la persona responsible  Signature of patient      Signature of responsible person                        ___________________________________                                   Nombre en imprenta de la persona responsible         Name of responsible  person          ___________________________________                 Relación con el paciente         Relationship to patient    _________________________________________  ______________ ________________  Firma del testigo          Fecha / Date Hora / Time  Signature of witness    DECLARACÓN DEL MÉDICO Mediante mi firma al calce, afirmo que antes de la hora del procedimiento, he explicadoal paciente y/o a whitaker representante legal, los riesgos y beneficios involucrados en el tratamiento propuesto, así comocualquier alternativa razonable al tratamiento propuesto. También le(s) he explicado los riesgos y beneficios involucradosen el rechazo del tratarniento propuesto y alternativas al tratamiento propuesto, y he respondido a las preguntas del(la) paciente(My signature below affirms that prior to the time of the procedure, I have explained to the patient and/or his/her guardian, therisks and benefits involved in the proposed treatment and any reasonable alternative to the proposed treatment. I have alsoexplained the risks and benefits involved in refusal of the proposed treatment and have answered the patient's questions.)    ________________________________________   _________________________   _____________   (Firma del médico/Signature of Physician)                                    (Fecha/Date)                                             (Hora/Time)      Patient Name: Pranav LEÓN Lloyd     : 1955                 Printed: 2024      Medical Record #: [id number]                                              Page 2 of 2